# Patient Record
Sex: FEMALE | Race: WHITE | NOT HISPANIC OR LATINO | Employment: FULL TIME | URBAN - METROPOLITAN AREA
[De-identification: names, ages, dates, MRNs, and addresses within clinical notes are randomized per-mention and may not be internally consistent; named-entity substitution may affect disease eponyms.]

---

## 2019-02-06 ENCOUNTER — TELEPHONE (OUTPATIENT)
Dept: SCHEDULING | Facility: IMAGING CENTER | Age: 29
End: 2019-02-06

## 2019-03-01 ENCOUNTER — OFFICE VISIT (OUTPATIENT)
Dept: MEDICAL GROUP | Facility: PHYSICIAN GROUP | Age: 29
End: 2019-03-01
Payer: COMMERCIAL

## 2019-03-01 VITALS
HEART RATE: 100 BPM | DIASTOLIC BLOOD PRESSURE: 58 MMHG | BODY MASS INDEX: 19.62 KG/M2 | HEIGHT: 67 IN | TEMPERATURE: 99.1 F | OXYGEN SATURATION: 96 % | WEIGHT: 125 LBS | SYSTOLIC BLOOD PRESSURE: 110 MMHG

## 2019-03-01 DIAGNOSIS — M62.838 MUSCLE SPASM: ICD-10-CM

## 2019-03-01 DIAGNOSIS — F41.1 GENERALIZED ANXIETY DISORDER: ICD-10-CM

## 2019-03-01 DIAGNOSIS — Z13.228 ENCOUNTER FOR SCREENING FOR METABOLIC DISORDER: ICD-10-CM

## 2019-03-01 DIAGNOSIS — F33.1 MODERATE EPISODE OF RECURRENT MAJOR DEPRESSIVE DISORDER (HCC): ICD-10-CM

## 2019-03-01 DIAGNOSIS — Z11.3 ENCOUNTER FOR SCREENING EXAMINATION FOR SEXUALLY TRANSMITTED DISEASE: ICD-10-CM

## 2019-03-01 PROCEDURE — 99204 OFFICE O/P NEW MOD 45 MIN: CPT | Performed by: INTERNAL MEDICINE

## 2019-03-01 RX ORDER — PROPRANOLOL HYDROCHLORIDE 20 MG/1
20 TABLET ORAL 3 TIMES DAILY
Qty: 90 TAB | Refills: 11 | Status: SHIPPED | OUTPATIENT
Start: 2019-03-01 | End: 2019-08-07 | Stop reason: SDUPTHER

## 2019-03-01 RX ORDER — BUPROPION HYDROCHLORIDE 150 MG/1
150 TABLET ORAL EVERY MORNING
COMMUNITY
End: 2019-03-01

## 2019-03-01 RX ORDER — PROPRANOLOL HYDROCHLORIDE 10 MG/1
10 TABLET ORAL 3 TIMES DAILY
COMMUNITY
End: 2019-03-01

## 2019-03-01 RX ORDER — CYCLOBENZAPRINE HCL 10 MG
10 TABLET ORAL 3 TIMES DAILY PRN
Qty: 30 TAB | Refills: 1 | Status: SHIPPED | OUTPATIENT
Start: 2019-03-01 | End: 2020-08-06 | Stop reason: SDUPTHER

## 2019-03-01 RX ORDER — BUPROPION HYDROCHLORIDE 300 MG/1
300 TABLET ORAL EVERY MORNING
Qty: 90 TAB | Refills: 1 | Status: SHIPPED | OUTPATIENT
Start: 2019-03-01 | End: 2019-03-20

## 2019-03-01 ASSESSMENT — PATIENT HEALTH QUESTIONNAIRE - PHQ9
SUM OF ALL RESPONSES TO PHQ QUESTIONS 1-9: 19
5. POOR APPETITE OR OVEREATING: 2 - MORE THAN HALF THE DAYS
CLINICAL INTERPRETATION OF PHQ2 SCORE: 4

## 2019-03-01 NOTE — ASSESSMENT & PLAN NOTE
This is a chronic health problem that is uncontrolled with current medications and lifestyle measures.  Patient is currently using bupropion 150 mg daily, PHQ 9 score in the office today was 19.Requesting for a psychiatry referral. Last seen 08/2018 in [place].

## 2019-03-01 NOTE — ASSESSMENT & PLAN NOTE
This is a new problem which started 1 month back . Denies any trauma except the regular physical stress at work.

## 2019-03-01 NOTE — ASSESSMENT & PLAN NOTE
This is a chronic health problem that is uncontrolled with current medications and lifestyle measures.  Currently on propranolol 10 mg 3 times daily.

## 2019-03-01 NOTE — PROGRESS NOTES
CC: To establish care with new PCP, depression.    HISTORY OF THE PRESENT ILLNESS: Patient is a 28 y.o. female. This pleasant patient is here today to discuss her medical conditions as mentioned in HPI below.  Patient has recently relocated from [place], has been following a psychiatrist there.  She also has ADHD as per the patient and I do not see any medications at this time.  Requesting for the controlled substances to initiate on her which I have deferred it to the psychiatric referral for their decision.    Health Maintenance: Due for HPV, tetanus and flu vaccines which patient thinks she received it in the previous place of this, will get the records.  Due for Pap smear which is okay to get in the office in her next visit.      Moderate episode of recurrent major depressive disorder (HCC)  This is a chronic health problem that is uncontrolled with current medications and lifestyle measures.  Patient is currently using bupropion 150 mg daily, PHQ 9 score in the office today was 19.Requesting for a psychiatry referral. Last seen 08/2018 in [place].    Generalized anxiety disorder  This is a chronic health problem that is uncontrolled with current medications and lifestyle measures.  Currently on propranolol 10 mg 3 times daily.    Muscle spasm  This is a new problem which started 1 month back . Denies any trauma except the regular physical stress at work.    PHQ score 19, BMI within normal limits, no tobacco, no fall injuries    Allergies: Patient has no known allergies.    Current Outpatient Prescriptions Ordered in [facility]   Medication Sig Dispense Refill   • propranolol (INDERAL) 20 MG Tab Take 1 Tab by mouth 3 times a day. 90 Tab 11   • buPROPion (WELLBUTRIN XL) 300 MG XL tablet Take 1 Tab by mouth every morning. 90 Tab 1   • cyclobenzaprine (FLEXERIL) 10 MG Tab Take 1 Tab by mouth 3 times a day as needed. 30 Tab 1     No current Epic-ordered facility-administered medications on file.        No past medical history  on file.    Past Surgical History:   Procedure Laterality Date   • TONSILLECTOMY         Social History   Substance Use Topics   • Smoking status: Current Every Day Smoker     Years: 12.00     Types: Cigarettes     Start date: 2006   • Smokeless tobacco: Never Used      Comment: 3-4 cigarettes / day   • Alcohol use 6.0 oz/week     10 Cans of beer per week      Comment: weekends        Social History     Social History Narrative   • No narrative on file       Family History   Problem Relation Age of Onset   • Cancer Mother         Ovarian cancer 40's.   • No Known Problems Father    • No Known Problems Sister    • No Known Problems Brother    • No Known Problems Maternal Grandmother    • No Known Problems Maternal Grandfather    • No Known Problems Paternal Grandmother    • No Known Problems Paternal Grandfather        ROS:     - Constitutional: Negative for fever, chills, unexpected weight change, and fatigue/generalized weakness.     - HEENT: Negative for headaches, vision changes, hearing changes, ear pain, ear discharge, rhinorrhea, sinus congestion, sore throat, and neck pain.      - Respiratory: Negative for cough, sputum production, chest congestion, dyspnea, wheezing, and crackles.      - Cardiovascular: Negative for chest pain, palpitations, orthopnea, and bilateral lower extremity edema.     - Gastrointestinal: Negative for heartburn, nausea, vomiting, abdominal pain, hematochezia, melena, diarrhea, constipation, and greasy/foul-smelling stools.     - Genitourinary: Negative for dysuria, polyuria, hematuria, pyuria, urinary urgency, and urinary incontinence.     - Musculoskeletal: Negative for myalgias, back pain, and joint pain.     - Skin: Negative for rash, itching, cyanotic skin color change.     - Neurological: Negative for dizziness, tingling, tremors, focal sensory deficit, focal weakness and headaches.     - Endo/Heme/Allergies: Does not bruise/bleed easily.     - Psychiatric/Behavioral: PHQ 9 is 19  "Negative for suicidal/homicidal ideation and memory loss.          Exam: Blood pressure 110/58, pulse 100, temperature 37.3 °C (99.1 °F), temperature source Temporal, height 1.702 m (5' 7\"), weight 56.7 kg (125 lb), SpO2 96 %. Body mass index is 19.58 kg/m².    General: Normal appearing. No distress.  HEENT: Normocephalic. Eyes conjunctiva clear lids without ptosis, pupils equal and reactive to light accommodation, ears normal shape and contour, canals are clear bilaterally, tympanic membranes are benign, nasal mucosa benign, oropharynx is without erythema, edema or exudates.   Neck: Supple without JVD or bruit. Thyroid is not enlarged.  Positive for right upper border of trapezius tense and tender.  Pulmonary: Clear to ausculation.  Normal effort. No rales, ronchi, or wheezing.  Cardiovascular: Regular rate and rhythm without murmur. Carotid and radial pulses are intact and equal bilaterally.  Abdomen: Soft, nontender, nondistended. Normal bowel sounds. Liver and spleen are not palpable  Neurologic: Grossly nonfocal  Lymph: No cervical, supraclavicular or axillary lymph nodes are palpable  Skin: Warm and dry.  No obvious lesions.  Musculoskeletal: Normal gait. No extremity cyanosis, clubbing, or edema.  Psych: PHQ 9 is 19 Normal mood and affect. Alert and oriented x3. Judgment and insight is normal.    Please note that this dictation was created using voice recognition software. I have made every reasonable attempt to correct obvious errors, but I expect that there are errors of grammar and possibly content that I did not discover before finalizing the note.      Assessment/Plan  1. Moderate episode of recurrent major depressive disorder (HCC)  Uncontrolled with PHQ 9 is 19.  I increased the dose of her Wellbutrin to 300 mg daily, will give referral to psychiatry and also check a thyroid function test and treat if abnormal.  - Patient has been identified as being depressed and appropriate orders and counseling have " been given  - REFERRAL TO PSYCHIATRY  - TSH WITH REFLEX TO FT4; Future  - buPROPion (WELLBUTRIN XL) 300 MG XL tablet; Take 1 Tab by mouth every morning.  Dispense: 90 Tab; Refill: 1    2. Generalized anxiety disorder  Uncontrolled, her heart rate at 100s.  Have increased the dose of propranolol 20 mg.  - propranolol (INDERAL) 20 MG Tab; Take 1 Tab by mouth 3 times a day.  Dispense: 90 Tab; Refill: 11    3. Encounter for screening examination for sexually transmitted disease  Patient requesting for STI screening, denies any exposure.  - HIV AG/AB COMBO ASSAY SCREENING; Future  - Chlamydia/GC PCR Urine Or Swab; Future  - RPR (SYPHILIS); Future  - HEPATITIS PANEL ACUTE(4 COMPONENTS); Future    4. Muscle spasm  Uncontrolled, will start on muscle relaxer.  - cyclobenzaprine (FLEXERIL) 10 MG Tab; Take 1 Tab by mouth 3 times a day as needed.  Dispense: 30 Tab; Refill: 1    5. Encounter for screening for metabolic disorder  Never had baseline lab work will do at this time.  - Comp Metabolic Panel; Future  - CBC WITH DIFFERENTIAL; Future

## 2019-03-09 ENCOUNTER — HOSPITAL ENCOUNTER (OUTPATIENT)
Dept: LAB | Facility: MEDICAL CENTER | Age: 29
End: 2019-03-09
Attending: INTERNAL MEDICINE
Payer: COMMERCIAL

## 2019-03-09 DIAGNOSIS — Z11.3 ENCOUNTER FOR SCREENING EXAMINATION FOR SEXUALLY TRANSMITTED DISEASE: ICD-10-CM

## 2019-03-09 DIAGNOSIS — F33.1 MODERATE EPISODE OF RECURRENT MAJOR DEPRESSIVE DISORDER (HCC): ICD-10-CM

## 2019-03-09 DIAGNOSIS — Z13.228 ENCOUNTER FOR SCREENING FOR METABOLIC DISORDER: ICD-10-CM

## 2019-03-09 LAB
ALBUMIN SERPL BCP-MCNC: 4.7 G/DL (ref 3.2–4.9)
ALBUMIN/GLOB SERPL: 1.7 G/DL
ALP SERPL-CCNC: 38 U/L (ref 30–99)
ALT SERPL-CCNC: 11 U/L (ref 2–50)
ANION GAP SERPL CALC-SCNC: 11 MMOL/L (ref 0–11.9)
AST SERPL-CCNC: 12 U/L (ref 12–45)
BASOPHILS # BLD AUTO: 0.6 % (ref 0–1.8)
BASOPHILS # BLD: 0.05 K/UL (ref 0–0.12)
BILIRUB SERPL-MCNC: 0.4 MG/DL (ref 0.1–1.5)
BUN SERPL-MCNC: 9 MG/DL (ref 8–22)
CALCIUM SERPL-MCNC: 9.5 MG/DL (ref 8.5–10.5)
CHLORIDE SERPL-SCNC: 107 MMOL/L (ref 96–112)
CO2 SERPL-SCNC: 22 MMOL/L (ref 20–33)
CREAT SERPL-MCNC: 0.68 MG/DL (ref 0.5–1.4)
EOSINOPHIL # BLD AUTO: 0.12 K/UL (ref 0–0.51)
EOSINOPHIL NFR BLD: 1.3 % (ref 0–6.9)
ERYTHROCYTE [DISTWIDTH] IN BLOOD BY AUTOMATED COUNT: 43.4 FL (ref 35.9–50)
FASTING STATUS PATIENT QL REPORTED: NORMAL
GLOBULIN SER CALC-MCNC: 2.8 G/DL (ref 1.9–3.5)
GLUCOSE SERPL-MCNC: 75 MG/DL (ref 65–99)
HAV IGM SERPL QL IA: NEGATIVE
HBV CORE IGM SER QL: NEGATIVE
HBV SURFACE AG SER QL: NEGATIVE
HCT VFR BLD AUTO: 47.7 % (ref 37–47)
HCV AB SER QL: NEGATIVE
HGB BLD-MCNC: 16.4 G/DL (ref 12–16)
HIV 1+2 AB+HIV1 P24 AG SERPL QL IA: NON REACTIVE
IMM GRANULOCYTES # BLD AUTO: 0.05 K/UL (ref 0–0.11)
IMM GRANULOCYTES NFR BLD AUTO: 0.6 % (ref 0–0.9)
LYMPHOCYTES # BLD AUTO: 2.09 K/UL (ref 1–4.8)
LYMPHOCYTES NFR BLD: 23.5 % (ref 22–41)
MCH RBC QN AUTO: 32.5 PG (ref 27–33)
MCHC RBC AUTO-ENTMCNC: 34.4 G/DL (ref 33.6–35)
MCV RBC AUTO: 94.5 FL (ref 81.4–97.8)
MONOCYTES # BLD AUTO: 0.59 K/UL (ref 0–0.85)
MONOCYTES NFR BLD AUTO: 6.6 % (ref 0–13.4)
NEUTROPHILS # BLD AUTO: 5.99 K/UL (ref 2–7.15)
NEUTROPHILS NFR BLD: 67.4 % (ref 44–72)
NRBC # BLD AUTO: 0 K/UL
NRBC BLD-RTO: 0 /100 WBC
PLATELET # BLD AUTO: 282 K/UL (ref 164–446)
PMV BLD AUTO: 10.9 FL (ref 9–12.9)
POTASSIUM SERPL-SCNC: 4.1 MMOL/L (ref 3.6–5.5)
PROT SERPL-MCNC: 7.5 G/DL (ref 6–8.2)
RBC # BLD AUTO: 5.05 M/UL (ref 4.2–5.4)
SODIUM SERPL-SCNC: 140 MMOL/L (ref 135–145)
TREPONEMA PALLIDUM IGG+IGM AB [PRESENCE] IN SERUM OR PLASMA BY IMMUNOASSAY: NON REACTIVE
TSH SERPL DL<=0.005 MIU/L-ACNC: 0.88 UIU/ML (ref 0.38–5.33)
WBC # BLD AUTO: 8.9 K/UL (ref 4.8–10.8)

## 2019-03-09 PROCEDURE — 87491 CHLMYD TRACH DNA AMP PROBE: CPT

## 2019-03-09 PROCEDURE — 80053 COMPREHEN METABOLIC PANEL: CPT

## 2019-03-09 PROCEDURE — 85025 COMPLETE CBC W/AUTO DIFF WBC: CPT

## 2019-03-09 PROCEDURE — 84443 ASSAY THYROID STIM HORMONE: CPT

## 2019-03-09 PROCEDURE — 36415 COLL VENOUS BLD VENIPUNCTURE: CPT

## 2019-03-09 PROCEDURE — 86780 TREPONEMA PALLIDUM: CPT

## 2019-03-09 PROCEDURE — 80074 ACUTE HEPATITIS PANEL: CPT

## 2019-03-09 PROCEDURE — 87389 HIV-1 AG W/HIV-1&-2 AB AG IA: CPT

## 2019-03-09 PROCEDURE — 87591 N.GONORRHOEAE DNA AMP PROB: CPT

## 2019-03-10 LAB
C TRACH DNA SPEC QL NAA+PROBE: NEGATIVE
N GONORRHOEA DNA SPEC QL NAA+PROBE: NEGATIVE
SPECIMEN SOURCE: NORMAL

## 2019-03-20 ENCOUNTER — HOSPITAL ENCOUNTER (OUTPATIENT)
Facility: MEDICAL CENTER | Age: 29
End: 2019-03-20
Attending: INTERNAL MEDICINE
Payer: COMMERCIAL

## 2019-03-20 ENCOUNTER — OFFICE VISIT (OUTPATIENT)
Dept: MEDICAL GROUP | Facility: PHYSICIAN GROUP | Age: 29
End: 2019-03-20
Payer: COMMERCIAL

## 2019-03-20 VITALS
HEART RATE: 81 BPM | DIASTOLIC BLOOD PRESSURE: 62 MMHG | BODY MASS INDEX: 19.58 KG/M2 | SYSTOLIC BLOOD PRESSURE: 116 MMHG | OXYGEN SATURATION: 96 % | HEIGHT: 67 IN | TEMPERATURE: 98.9 F

## 2019-03-20 DIAGNOSIS — Z12.4 PAP SMEAR FOR CERVICAL CANCER SCREENING: ICD-10-CM

## 2019-03-20 DIAGNOSIS — Z72.0 TOBACCO USE: ICD-10-CM

## 2019-03-20 DIAGNOSIS — R92.30 DENSE BREAST TISSUE: ICD-10-CM

## 2019-03-20 DIAGNOSIS — Z71.6 ENCOUNTER FOR SMOKING CESSATION COUNSELING: ICD-10-CM

## 2019-03-20 PROCEDURE — 88175 CYTOPATH C/V AUTO FLUID REDO: CPT

## 2019-03-20 PROCEDURE — 87624 HPV HI-RISK TYP POOLED RSLT: CPT

## 2019-03-20 PROCEDURE — 99000 SPECIMEN HANDLING OFFICE-LAB: CPT | Performed by: INTERNAL MEDICINE

## 2019-03-20 PROCEDURE — 99395 PREV VISIT EST AGE 18-39: CPT | Performed by: INTERNAL MEDICINE

## 2019-03-20 RX ORDER — BUPROPION HYDROCHLORIDE 150 MG/1
150 TABLET ORAL EVERY MORNING
COMMUNITY
End: 2019-03-20 | Stop reason: SDUPTHER

## 2019-03-20 RX ORDER — BUPROPION HYDROCHLORIDE 150 MG/1
150 TABLET ORAL EVERY MORNING
Qty: 90 TAB | Refills: 1 | Status: SHIPPED | OUTPATIENT
Start: 2019-03-20 | End: 2019-08-07 | Stop reason: SDUPTHER

## 2019-03-21 DIAGNOSIS — Z12.4 PAP SMEAR FOR CERVICAL CANCER SCREENING: ICD-10-CM

## 2019-03-21 NOTE — ASSESSMENT & PLAN NOTE
This is a chronic health problem that is uncontrolled with current medications and lifestyle measures.  Takes up to 4 cigarettes every day.

## 2019-03-21 NOTE — PROGRESS NOTES
SUBJECTIVE: 28 y.o. female for annual routine gynecologic exam  Chief Complaint   Patient presents with   • Gynecologic Exam   • Other     lowered dose of wellbutrin       Obstetric History       T0      L0     SAB0   TAB0   Ectopic0   Molar0   Multiple0   Live Births0       Last Pap:  , normal.  History   Sexual Activity   • Sexual activity: Yes   • Partners: Male   • Birth control/ protection: IUD     Comment: Single, Work as Customer service      Sexual history: currently sexually active   H/O Abnormal Pap no  She  reports that she has been smoking Cigarettes.  She started smoking about 13 years ago. She has smoked for the past 12.00 years. She has never used smokeless tobacco.        Allergies: Patient has no known allergies.     ROS:    Menses every month with 28 days moderate bleeding   Cramping is mild, moderate.   She does take OTC analgesics for cramping  No significant bloating/fluid retention, pelvic pain, or dyspareunia. No vaginal discharge   No breast tenderness, mass, nipple discharge, changes in size or contour, or abnormal cyclic discomfort.  No urinary tract symptoms, no incontinence, no polydipsia, polyuria,  No abdominal pain, change in bowel habits, black or bloody stools.    No unusual headaches, no visual changes, menstrual migraines   No prolonged cough. No dyspnea or chest pain on exertion.  No depression, labile mood, anxiety, libido changes, insomnia.  No temperature intolerance.  No new/concerning skin lesions, concerns.     Exercise: minimal exercise  Preventive Care:Uptodate    Current medicines (including changes today)  Current Outpatient Prescriptions   Medication Sig Dispense Refill   • buPROPion (WELLBUTRIN XL) 150 MG XL tablet Take 150 mg by mouth every morning.     • propranolol (INDERAL) 20 MG Tab Take 1 Tab by mouth 3 times a day. 90 Tab 11   • cyclobenzaprine (FLEXERIL) 10 MG Tab Take 1 Tab by mouth 3 times a day as needed. 30 Tab 1   • buPROPion (WELLBUTRIN  "XL) 300 MG XL tablet Take 1 Tab by mouth every morning. (Patient not taking: Reported on 3/20/2019) 90 Tab 1     No current facility-administered medications for this visit.      She  has no past medical history on file.  She  has a past surgical history that includes tonsillectomy.     Family History:   Family History   Problem Relation Age of Onset   • Cancer Mother         Ovarian cancer 40's.   • No Known Problems Father    • No Known Problems Sister    • No Known Problems Brother    • No Known Problems Maternal Grandmother    • No Known Problems Maternal Grandfather    • No Known Problems Paternal Grandmother    • No Known Problems Paternal Grandfather        OBJECTIVE:   /62 (BP Location: Left arm, Patient Position: Sitting, BP Cuff Size: Adult)   Pulse 81   Temp 37.2 °C (98.9 °F) (Temporal)   Ht 1.702 m (5' 7\")   SpO2 96%   BMI 19.58 kg/m²   Body mass index is 19.58 kg/m².    HEAD AND NECK:  Ears normal.  Throat, oral cavity and tongue normal.  Neck supple. No adenopathy or masses in the neck or supraclavicular regions.  No carotid bruits. No thyromegaly. NEURO: Cranial nerves are normal. DTR's normal and symmetric.  CHEST:  Clear, good air entry, no wheezes or rales. HEART:  Regular rate and rhythm.  S1 and S2 normal.  No edema or JVD. ABDOMEN:  Soft without tenderness, guarding, mass or organomegaly.  No CVA tenderness or inguinal adenopathy. EXTREMITIES:  Extremities, reflexes and peripheral pulses are normal. SKIN: color normal, vascularity normal, no edema, temperature normal   No rashes or suspicious skin lesions noted.     Breast Exam: Performed with instruction during examination. No axillary lymphadenopathy, no skin changes, no dominant masses. No nipple retraction .  Positive for left breast mildly dense tissue in 3 o'clock position, no tenderness or erythema.  Pelvic Exam -  Normal external genitalia with no lesions. Normal vaginal mucosa with normal rugation and no discharge. Cervix " with no visible lesions. No cervical motion tenderness. Uterus is normal sized with no masses. No adnexal tenderness or enlargement appreciated. Thin Prep Pap is obtained, vaginal swab is not obtained and specimen(s) sent to lab  Rectal: deferred    <ASSESSMENT and PLAN>  1. Pap smear for cervical cancer screening  - THINPREP PAP WITH HPV; Future    2. Tobacco use  3. Encounter for smoking cessation counseling  Patient was counseled on smoking cessation, we discussed the benefits of quitting including decrease risk of MI by 50% after one year of cessation, decreased risk of lung cancer after 12 years, one cigarette is enough to paralyze cilia for 24 hours leading to increase risk of pulmonary infection, etc.... .Also encouraged to set a stop date.  This took up to the duration of 3 minutes.    4. Dense breast tissue  As mentioned in physical exam findings, this looks like normal dense breast tissue of a young female.  But given instructions to check once a week and let me know if any changes for possible need of ultrasound at that time.  Patient understood and agreed with the plan.    Discussed  breast self exam, STD prevention   Follow-up in 1 years for next Gyn exam and 3 years for next Pap.   Next office visit for recheck of chronic medical conditions is due in 4 months

## 2019-03-22 LAB
CYTOLOGY REG CYTOL: NORMAL
HPV HR 12 DNA CVX QL NAA+PROBE: NEGATIVE
HPV16 DNA SPEC QL NAA+PROBE: NEGATIVE
HPV18 DNA SPEC QL NAA+PROBE: NEGATIVE
SPECIMEN SOURCE: NORMAL

## 2019-03-25 RX ORDER — FLUCONAZOLE 150 MG/1
150 TABLET ORAL DAILY
Qty: 2 TAB | Refills: 0 | Status: SHIPPED | OUTPATIENT
Start: 2019-03-25 | End: 2019-03-27

## 2019-03-26 NOTE — PROGRESS NOTES
You do have some Fungal Candida seen in the smear. I have sent Diflucan tablet to your pharmacy.  Your Pap smear is normal, recommend repeat after 3 years.  Alisa Guaman M.D.

## 2019-06-05 ENCOUNTER — OFFICE VISIT (OUTPATIENT)
Dept: MEDICAL GROUP | Facility: PHYSICIAN GROUP | Age: 29
End: 2019-06-05
Payer: COMMERCIAL

## 2019-06-05 VITALS
OXYGEN SATURATION: 99 % | DIASTOLIC BLOOD PRESSURE: 74 MMHG | WEIGHT: 122 LBS | HEIGHT: 67 IN | HEART RATE: 95 BPM | TEMPERATURE: 98.7 F | BODY MASS INDEX: 19.15 KG/M2 | SYSTOLIC BLOOD PRESSURE: 122 MMHG

## 2019-06-05 DIAGNOSIS — Z72.0 TOBACCO USE: ICD-10-CM

## 2019-06-05 DIAGNOSIS — Z23 NEED FOR VACCINATION: ICD-10-CM

## 2019-06-05 DIAGNOSIS — N63.20 LEFT BREAST LUMP: ICD-10-CM

## 2019-06-05 DIAGNOSIS — Z71.6 ENCOUNTER FOR SMOKING CESSATION COUNSELING: ICD-10-CM

## 2019-06-05 PROCEDURE — 99406 BEHAV CHNG SMOKING 3-10 MIN: CPT | Performed by: INTERNAL MEDICINE

## 2019-06-05 PROCEDURE — 90472 IMMUNIZATION ADMIN EACH ADD: CPT | Performed by: INTERNAL MEDICINE

## 2019-06-05 PROCEDURE — 99214 OFFICE O/P EST MOD 30 MIN: CPT | Mod: 25 | Performed by: INTERNAL MEDICINE

## 2019-06-05 PROCEDURE — 90715 TDAP VACCINE 7 YRS/> IM: CPT | Performed by: INTERNAL MEDICINE

## 2019-06-05 PROCEDURE — 90732 PPSV23 VACC 2 YRS+ SUBQ/IM: CPT | Performed by: INTERNAL MEDICINE

## 2019-06-05 PROCEDURE — 90471 IMMUNIZATION ADMIN: CPT | Performed by: INTERNAL MEDICINE

## 2019-06-06 NOTE — PROGRESS NOTES
CC: Short visit, left breast lump.    HISTORY OF PRESENT ILLNESS: Patient is a 29 y.o. female established patient who presents today to discuss on medical conditions as mentioned in HPI below.    Health Maintenance: Patient is due for pneumonia vaccine and tetanus vaccine which is okay to get in the office today.    Tobacco use  This is a chronic health problem that is uncontrolled with current medications and lifestyle measures. Currently on 4 cig / day. Tried the patch before but ended up with smoking on the patch. Has been smoking for 12 years.    Left breast lump  This is a new problem which she saw few months back and she did have dense breasts in her Pap visit with me. Denies any discharge from nipple.  Does have occasional tenderness when patient lies to one side.      PHQ score 0, BMI within normal limits, chronic active tobacco, no fall injuries.    Patient Active Problem List    Diagnosis Date Noted   • Left breast lump 06/05/2019   • Tobacco use 03/20/2019   • Moderate episode of recurrent major depressive disorder (HCC) 03/01/2019   • Generalized anxiety disorder 03/01/2019   • Encounter for screening examination for sexually transmitted disease 03/01/2019   • Muscle spasm 03/01/2019      Allergies:Patient has no known allergies.    Current Outpatient Prescriptions   Medication Sig Dispense Refill   • buPROPion (WELLBUTRIN XL) 150 MG XL tablet Take 1 Tab by mouth every morning. 90 Tab 1   • propranolol (INDERAL) 20 MG Tab Take 1 Tab by mouth 3 times a day. 90 Tab 11   • cyclobenzaprine (FLEXERIL) 10 MG Tab Take 1 Tab by mouth 3 times a day as needed. 30 Tab 1     No current facility-administered medications for this visit.        Social History   Substance Use Topics   • Smoking status: Current Every Day Smoker     Years: 12.00     Types: Cigarettes     Start date: 2006   • Smokeless tobacco: Never Used      Comment: 3-4 cigarettes / day   • Alcohol use 6.0 oz/week     10 Cans of beer per week       "Comment: weekends      Social History     Social History Narrative   • No narrative on file       Family History   Problem Relation Age of Onset   • Cancer Mother         Ovarian cancer 40's.   • No Known Problems Father    • No Known Problems Sister    • No Known Problems Brother    • No Known Problems Maternal Grandmother    • No Known Problems Maternal Grandfather    • No Known Problems Paternal Grandmother    • No Known Problems Paternal Grandfather         ROS:     - Constitutional:  Negative for fever, chills, unexpected weight change, and fatigue/generalized weakness.    - HEENT:  Negative for headaches, vision changes, hearing changes, ear pain, ear discharge, rhinorrhea, sinus congestion, sore throat, and neck pain.      - Respiratory: Negative for cough, sputum production, chest congestion, dyspnea, wheezing, and crackles.      - Cardiovascular: Negative for chest pain, palpitations, orthopnea, and bilateral lower extremity edema.     - Gastrointestinal: Negative for heartburn, nausea, vomiting, abdominal pain, hematochezia, melena, diarrhea, constipation, and greasy/foul-smelling stools.     - Genitourinary: Negative for dysuria, polyuria, hematuria, pyuria, urinary urgency, and urinary incontinence.     - Musculoskeletal: Negative for myalgias, back pain, and joint pain.     - Skin: As mentioned in HPI above negative for rash, itching, cyanotic skin color change.     - Neurological: Negative for dizziness, tingling, tremors, focal sensory deficit, focal weakness and headaches.     - Endo/Heme/Allergies: Does not bruise/bleed easily.     - Psychiatric/Behavioral: Negative for depression, suicidal/homicidal ideation and memory loss.      Last lab work in April 2019 reviewed.      Exam:    /74 (BP Location: Right arm, Patient Position: Sitting, BP Cuff Size: Adult)   Pulse 95   Temp 37.1 °C (98.7 °F) (Temporal)   Ht 1.702 m (5' 7\")   Wt 55.3 kg (122 lb)   SpO2 99%  Body mass index is 19.11 " kg/m².    General:  Well nourished, well developed female in NAD  Head is grossly normal.  Neck: Supple without JVD or bruit. Thyroid is not enlarged.  Pulmonary: Clear to ausculation and percussion.  Normal effort. No rales, ronchi, or wheezing.  Cardiovascular: Regular rate and rhythm without murmur. Carotid and radial pulses are intact and equal bilaterally.  Extremities: no clubbing, cyanosis, or edema.  Breast exam : Right breast within normal limits, left breast positive for small density palpable in the upper outer quadrant measuring around 1 cm in diameter, tenderness while palpating.  No axillary lymphadenopathy.    Please note that this dictation was created using voice recognition software. I have made every reasonable attempt to correct obvious errors, but I expect that there are errors of grammar and possibly content that I did not discover before finalizing the note.    Assessment/Plan:  1. Left breast lump  New problem, per my HPI above and physical exam findings will place an ultrasound of the breast at this time, will await for the report for further recommendations.  Plan extend to the patient which she understood and agreed.  - US-LOCALIZATION BREAST SINGLE LEFT; Future    2. Tobacco use  3. Encounter for smoking cessation counseling  Patient was counseled on smoking cessation, we discussed the benefits of quitting including decrease risk of MI by 50% after one year of cessation, decreased risk of lung cancer after 12 years, one cigarette is enough to paralyze cilia for 24 hours leading to increase risk of pulmonary infection, etc.... .Also encouraged to set a stop date.  This took more than 3 minutes duration.  - To continue current Wellbutrin 150 mg daily.  - REFERRAL TO TOBACCO CESSATION PROGRAM    4. Need for vaccination  - PneumoVax PPV23 =>1yo  - Tdap =>8yo IM

## 2019-06-06 NOTE — ASSESSMENT & PLAN NOTE
This is a chronic health problem that is uncontrolled with current medications and lifestyle measures. Currently on 4 cig / day. Tried the patch before but ended up with smoking on the patch. Has been smoking for 12 years.

## 2019-06-06 NOTE — ASSESSMENT & PLAN NOTE
This is a new problem which she saw few months back and she did have dense breasts in her Pap visit with me. Denies any discharge from nipple.  Does have occasional tenderness when patient lies to one side.

## 2019-07-01 ENCOUNTER — HOSPITAL ENCOUNTER (OUTPATIENT)
Dept: RADIOLOGY | Facility: MEDICAL CENTER | Age: 29
End: 2019-07-01
Attending: INTERNAL MEDICINE
Payer: COMMERCIAL

## 2019-07-01 DIAGNOSIS — N63.20 LEFT BREAST LUMP: ICD-10-CM

## 2019-07-01 PROCEDURE — 76642 ULTRASOUND BREAST LIMITED: CPT | Mod: LT

## 2019-08-06 DIAGNOSIS — F41.1 GENERALIZED ANXIETY DISORDER: ICD-10-CM

## 2019-08-06 NOTE — TELEPHONE ENCOUNTER
Was the patient seen in the last year in this department? Yes    Does patient have an active prescription for medications requested? Yes    Received Request Via: Patient     Pt prefers a 90 day supply.

## 2019-08-07 RX ORDER — PROPRANOLOL HYDROCHLORIDE 20 MG/1
20 TABLET ORAL 3 TIMES DAILY
Qty: 270 TAB | Refills: 1 | Status: SHIPPED | OUTPATIENT
Start: 2019-08-07 | End: 2020-08-11

## 2019-08-07 RX ORDER — BUPROPION HYDROCHLORIDE 150 MG/1
150 TABLET ORAL EVERY MORNING
Qty: 90 TAB | Refills: 1 | Status: SHIPPED | OUTPATIENT
Start: 2019-08-07 | End: 2020-08-11 | Stop reason: SDUPTHER

## 2019-08-07 NOTE — TELEPHONE ENCOUNTER
Refill X 6 months, sent to pharmacy.Pt. Seen in the last 6 months per protocol.   Lab Results   Component Value Date/Time    SODIUM 140 03/09/2019 10:16 AM    POTASSIUM 4.1 03/09/2019 10:16 AM    CHLORIDE 107 03/09/2019 10:16 AM    CO2 22 03/09/2019 10:16 AM    GLUCOSE 75 03/09/2019 10:16 AM    BUN 9 03/09/2019 10:16 AM    CREATININE 0.68 03/09/2019 10:16 AM

## 2020-07-06 ENCOUNTER — OFFICE VISIT (OUTPATIENT)
Dept: MEDICAL GROUP | Facility: MEDICAL CENTER | Age: 30
End: 2020-07-06
Payer: COMMERCIAL

## 2020-07-06 ENCOUNTER — HOSPITAL ENCOUNTER (OUTPATIENT)
Facility: MEDICAL CENTER | Age: 30
End: 2020-07-06
Attending: NURSE PRACTITIONER
Payer: COMMERCIAL

## 2020-07-06 VITALS
OXYGEN SATURATION: 96 % | TEMPERATURE: 99.2 F | SYSTOLIC BLOOD PRESSURE: 132 MMHG | DIASTOLIC BLOOD PRESSURE: 70 MMHG | HEART RATE: 89 BPM | RESPIRATION RATE: 14 BRPM | HEIGHT: 66 IN | BODY MASS INDEX: 19.13 KG/M2 | WEIGHT: 119.05 LBS

## 2020-07-06 DIAGNOSIS — F31.31 BIPOLAR AFFECTIVE DISORDER, CURRENTLY DEPRESSED, MILD (HCC): ICD-10-CM

## 2020-07-06 DIAGNOSIS — G44.86 CERVICOGENIC HEADACHE: ICD-10-CM

## 2020-07-06 DIAGNOSIS — N89.8 VAGINAL IRRITATION: ICD-10-CM

## 2020-07-06 PROCEDURE — 99214 OFFICE O/P EST MOD 30 MIN: CPT | Performed by: NURSE PRACTITIONER

## 2020-07-06 PROCEDURE — 87660 TRICHOMONAS VAGIN DIR PROBE: CPT

## 2020-07-06 PROCEDURE — 87510 GARDNER VAG DNA DIR PROBE: CPT

## 2020-07-06 PROCEDURE — 87480 CANDIDA DNA DIR PROBE: CPT

## 2020-07-06 RX ORDER — NAPROXEN 500 MG/1
500 TABLET ORAL 2 TIMES DAILY WITH MEALS
Qty: 60 TAB | Refills: 2 | Status: SHIPPED | OUTPATIENT
Start: 2020-07-06 | End: 2021-11-08

## 2020-07-06 ASSESSMENT — PATIENT HEALTH QUESTIONNAIRE - PHQ9
SUM OF ALL RESPONSES TO PHQ9 QUESTIONS 1 AND 2: 2
8. MOVING OR SPEAKING SO SLOWLY THAT OTHER PEOPLE COULD HAVE NOTICED. OR THE OPPOSITE, BEING SO FIGETY OR RESTLESS THAT YOU HAVE BEEN MOVING AROUND A LOT MORE THAN USUAL: 0
9. THOUGHTS THAT YOU WOULD BE BETTER OFF DEAD, OR OF HURTING YOURSELF: 0
4. FEELING TIRED OR HAVING LITTLE ENERGY: 3
7. TROUBLE CONCENTRATING ON THINGS, SUCH AS READING THE NEWSPAPER OR WATCHING TELEVISION: 1
1. LITTLE INTEREST OR PLEASURE IN DOING THINGS: 1
5. POOR APPETITE OR OVEREATING: 2
SUM OF ALL RESPONSES TO PHQ QUESTIONS 1-9: 11
3. TROUBLE FALLING OR STAYING ASLEEP OR SLEEPING TOO MUCH: 2
6. FEELING BAD ABOUT YOURSELF - OR THAT YOU ARE A FAILURE OR HAVE LET YOURSELF OR YOUR FAMILY DOWN: 1
2. FEELING DOWN, DEPRESSED, IRRITABLE, OR HOPELESS: 1

## 2020-07-06 ASSESSMENT — FIBROSIS 4 INDEX: FIB4 SCORE: 0.36

## 2020-07-06 ASSESSMENT — ANXIETY QUESTIONNAIRES
2. NOT BEING ABLE TO STOP OR CONTROL WORRYING: NEARLY EVERY DAY
3. WORRYING TOO MUCH ABOUT DIFFERENT THINGS: NEARLY EVERY DAY
6. BECOMING EASILY ANNOYED OR IRRITABLE: SEVERAL DAYS
5. BEING SO RESTLESS THAT IT IS HARD TO SIT STILL: MORE THAN HALF THE DAYS
GAD7 TOTAL SCORE: 15
7. FEELING AFRAID AS IF SOMETHING AWFUL MIGHT HAPPEN: SEVERAL DAYS
1. FEELING NERVOUS, ANXIOUS, OR ON EDGE: NEARLY EVERY DAY
4. TROUBLE RELAXING: MORE THAN HALF THE DAYS

## 2020-07-06 NOTE — ASSESSMENT & PLAN NOTE
Chronic issue currently managed with bupropion 150 mg daily. States medication was initially working

## 2020-07-06 NOTE — ASSESSMENT & PLAN NOTE
Pt states she is having frequent neck pain and muscle tension in the neck causing bilateral occipital headache. 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 Her work station is adjustable but her employer has not raised it. She has tried finding seats of various heights but not really found any set-up that would not require her to be constantly looking downward and straining her neck.   She occasionally takes ibuprofen with slight relief but not full resolution of pain.  No h/o neck injury or head injury, change in ROM, pain radiating into arms. No numbness or tingling, weakness in the arms. No vision changes or nausea with HA  She had been given a muscle relaxer in the past but felt this made her excessively tired, even when taking it only at bedtime- had difficulty waking in the morning

## 2020-07-06 NOTE — ASSESSMENT & PLAN NOTE
States symptoms started after having her IUD removed a month ago. Was treated with a dose of diflucan several weeks ago but never felt symptoms fully resolved. Still having itching, irritation, increase in white discharge. No pelvic pain, spotting, odor

## 2020-07-06 NOTE — LETTER
July 6, 2020        RE: Sheri Carbajal    To Whom It May Concern;    Sheri Carbajal is seen in my office for primary care. She is diagnosed with chronic neck pain and headaches which are likely worsened by the ergonomic set-up of her work station. I recommend her work station be evaluated for correct ergonomics or the height of her work station be raised/ adjusted. If workplace accomodation paperwork is needed please send to the above fax number.    Please contact me with any questions.    Sincerely,          Arminda LAWRENCE

## 2020-07-07 DIAGNOSIS — N89.8 VAGINAL IRRITATION: ICD-10-CM

## 2020-07-07 NOTE — ASSESSMENT & PLAN NOTE
Reports having been diagnosed with bipolar disorder several years ago, was followed by psychiatry when living in [place]. States she had tried multiple mood stablizers and SSRI's including prozac, lexapro, lamictal abilify, carbamazepine, none of which fully controlled symptoms. She was started on wellbutrin and has found this to be the most helpful although symptoms are still not fully controlled. Still feeling somewhat depressed as well as anxious. Requesting referral to establish with local psychiatric provider  No si/hi, no current manic behavior or delusional thinking  Patient Health Questionaire    Little interest or pleasure in doing things?: 1   Feeling down, depressed, or hopeless?: 1  Trouble falling or staying asleep, or sleeping too much? : 2  Feeling tired or having little energy? : 3  Poor appetite or overeating? : 2  Feeling bad about yourself - or that you are a failure or have let yourself or your family down? : 1  Trouble concentrating on things, such as reading the newspaper or watching television? : 1  Moving or speaking so slowly that other people could have noticed.  Or the opposite - being so fidgety or restless that you have been moving around alot more than usual. : 0  Thoughts that you would be better off dead, or of hurting yourself?: 0  Patient Health Questionnaire Score: 11    If depressive symptoms identified deferred to follow up visit unless specifically addressed in assesment and plan.    Interpretation of PHQ-9 Total Score   Score Severity   1-4 No Depression   5-9 Mild Depression   10-14 Moderate Depression   15-19 Moderately Severe Depression   20-27 Severe Depression    SAMSON-7 Questionnaire    Feeling nervous, anxious, or on edge: Nearly every day  Not being able to sop or control worrying: Nearly every day  Worrying too much about different things: Nearly every day  Trouble relaxing: More than half the days  Being so restless that it's hard to sit still: More than half the  days  Becoming easily annoyed or irritable: Several days  Feeling afraid as if something awful might happen: Several days  Total: 15    Interpretation of SAMSON 7 Total Score   Score Severity :  0-4 No Anxiety   5-9 Mild Anxiety  10-14 Moderate Anxiety  15-21 Severe Anxiety

## 2020-07-07 NOTE — PROGRESS NOTES
Subjective:     Chief Complaint   Patient presents with   • Establish Care     new patient, neck/ head pain from work   • Vaginitis     infection     Sheri Carbajal is a 30 y.o. female here to establish care    Vaginal irritation  States symptoms started after having her IUD removed a month ago. Was treated with a dose of diflucan several weeks ago but never felt symptoms fully resolved. Still having itching, irritation, increase in white discharge. No pelvic pain, spotting, odor    Cervicogenic headache  Pt states she is having frequent neck pain and muscle tension in the neck causing bilateral occipital headache. 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 Her work station is adjustable but her employer has not raised it. She has tried finding seats of various heights but not really found any set-up that would not require her to be constantly looking downward and straining her neck.   She occasionally takes ibuprofen with slight relief but not full resolution of pain.  No h/o neck injury or head injury, change in ROM, pain radiating into arms. No numbness or tingling, weakness in the arms. No vision changes or nausea with HA  She had been given a muscle relaxer in the past but felt this made her excessively tired, even when taking it only at bedtime- had difficulty waking in the morning    Bipolar affective disorder, currently depressed, mild (HCC)  Reports having been diagnosed with bipolar disorder several years ago, was followed by psychiatry when living in [place]. States she had tried multiple mood stablizers and SSRI's including prozac, lexapro, lamictal abilify, carbamazepine, none of which fully controlled symptoms. She was started on wellbutrin and has found this to be the most helpful although symptoms are still not fully controlled. Still feeling somewhat depressed as well as anxious. Requesting referral to establish with local psychiatric  provider  No si/hi, no current manic behavior or delusional thinking  Patient Health Questionaire    Little interest or pleasure in doing things?: 1   Feeling down, depressed, or hopeless?: 1  Trouble falling or staying asleep, or sleeping too much? : 2  Feeling tired or having little energy? : 3  Poor appetite or overeating? : 2  Feeling bad about yourself - or that you are a failure or have let yourself or your family down? : 1  Trouble concentrating on things, such as reading the newspaper or watching television? : 1  Moving or speaking so slowly that other people could have noticed.  Or the opposite - being so fidgety or restless that you have been moving around alot more than usual. : 0  Thoughts that you would be better off dead, or of hurting yourself?: 0  Patient Health Questionnaire Score: 11    If depressive symptoms identified deferred to follow up visit unless specifically addressed in assesment and plan.    Interpretation of PHQ-9 Total Score   Score Severity   1-4 No Depression   5-9 Mild Depression   10-14 Moderate Depression   15-19 Moderately Severe Depression   20-27 Severe Depression    SAMSON-7 Questionnaire    Feeling nervous, anxious, or on edge: Nearly every day  Not being able to sop or control worrying: Nearly every day  Worrying too much about different things: Nearly every day  Trouble relaxing: More than half the days  Being so restless that it's hard to sit still: More than half the days  Becoming easily annoyed or irritable: Several days  Feeling afraid as if something awful might happen: Several days  Total: 15    Interpretation of SAMSON 7 Total Score   Score Severity :  0-4 No Anxiety   5-9 Mild Anxiety  10-14 Moderate Anxiety  15-21 Severe Anxiety           Current medicines (including changes today)  Current Outpatient Medications   Medication Sig Dispense Refill   • naproxen (NAPROSYN) 500 MG Tab Take 1 Tab by mouth 2 times a day, with meals. 60 Tab 2   • propranolol (INDERAL) 20 MG Tab  "Take 1 Tab by mouth 3 times a day. 270 Tab 1   • buPROPion (WELLBUTRIN XL) 150 MG XL tablet Take 1 Tab by mouth every morning. 90 Tab 1   • cyclobenzaprine (FLEXERIL) 10 MG Tab Take 1 Tab by mouth 3 times a day as needed. (Patient not taking: Reported on 7/6/2020) 30 Tab 1     No current facility-administered medications for this visit.      She  has no past medical history on file.    ROS included above     Objective:     /70 (BP Location: Left arm, Patient Position: Sitting, BP Cuff Size: Small adult)   Pulse 89   Temp 37.3 °C (99.2 °F) (Temporal)   Resp 14   Ht 1.676 m (5' 6\")   Wt 54 kg (119 lb 0.8 oz)   SpO2 96%  Body mass index is 19.21 kg/m².     Physical Exam:  General: Alert, oriented in no acute distress.  Eye contact is good, speech is normal, affect anxious  Lungs: clear to auscultation bilaterally, normal effort, no wheeze/ rhonchi/ rales.  CV: regular rate and rhythm, S1, S2, no murmur  MS: mild tenderness over multiple cervical spinous process, no obvious deformity. Muscular tenderness in bilateral trapezius without hypertonicity. Strength 5/5 in bilateral UE, neck with normal ROM  Ext: no edema, color normal, vascularity normal, temperature normal    Assessment and Plan:   The following treatment plan was discussed   1. Vaginal irritation  Pt feels she has a yeast infection. Swab sent for confirmation, will treat pending results  VAGINAL PATHOGENS DNA PANEL   2. Cervicogenic headache  Occipital HA and muscular neck pain exacerbated by work place ergonomics. Letter written requesting either adjustable height desk or ergonomic evaluation. I will complete workplace accommodation paperwork if needed.  We will also try physical therapy and naproxen as needed, advised to take medication with food, do not combine with other NSAIDs  REFERRAL TO PHYSICAL THERAPY Reason for Therapy: Eval/Treat/Report    naproxen (NAPROSYN) 500 MG Tab   3. Bipolar affective disorder, currently depressed, mild (HCC)  " Uncontrolled at this time on wellbutrin, still having persistent depression and anxiety. No current manic bx. She has failed multiple medications in the past, will consult psychiatry for med management  REFERRAL TO PSYCHIATRY       Followup: pending tests         Please note that this dictation was created using voice recognition software. I have worked with consultants from the vendor as well as technical experts from [facility] to optimize the interface. I have made every reasonable attempt to correct obvious errors, but I expect that there are errors of grammar and possibly content that I did not discover before finalizing the note.

## 2020-07-08 LAB
CANDIDA DNA VAG QL PROBE+SIG AMP: NEGATIVE
G VAGINALIS DNA VAG QL PROBE+SIG AMP: NEGATIVE
T VAGINALIS DNA VAG QL PROBE+SIG AMP: NEGATIVE

## 2020-07-24 ENCOUNTER — NURSE TRIAGE (OUTPATIENT)
Dept: HEALTH INFORMATION MANAGEMENT | Facility: OTHER | Age: 30
End: 2020-07-24

## 2020-07-24 NOTE — TELEPHONE ENCOUNTER
1. Caller Name: Sheri Carbajal                     Call Back Number: [phone] (home)       [facility] PCP or Specialty Provider: Yes HOWARD Mae        2.  In the last two weeks, has the patient had any new or worsening symptoms (not explained by alternative diagnosis)? No.    3.  Does patient have any comoribidities? None     4.  Has the patient traveled in the last 14 days OR had any known contact with someone who is suspected or confirmed to have COVID-19?  Yes, Coworker possibly has Covid      Note routed to [facility] Provider: FYI only.     Has an appt at urgent care. States employer has a contract with [facility] to do testing. Verified with scheduling that pt may go ahead and go to urgent care. Pt plans to go to [facility].

## 2020-07-30 ENCOUNTER — OFFICE VISIT (OUTPATIENT)
Dept: MEDICAL GROUP | Facility: MEDICAL CENTER | Age: 30
End: 2020-07-30
Payer: COMMERCIAL

## 2020-07-30 VITALS
OXYGEN SATURATION: 99 % | TEMPERATURE: 102.1 F | DIASTOLIC BLOOD PRESSURE: 68 MMHG | RESPIRATION RATE: 20 BRPM | HEART RATE: 103 BPM | SYSTOLIC BLOOD PRESSURE: 102 MMHG | WEIGHT: 119.05 LBS | BODY MASS INDEX: 19.13 KG/M2 | HEIGHT: 66 IN

## 2020-07-30 DIAGNOSIS — M79.10 MYALGIA: ICD-10-CM

## 2020-07-30 DIAGNOSIS — M54.42 ACUTE MIDLINE LOW BACK PAIN WITH LEFT-SIDED SCIATICA: ICD-10-CM

## 2020-07-30 DIAGNOSIS — R50.9 FEVER AND CHILLS: ICD-10-CM

## 2020-07-30 PROCEDURE — 99214 OFFICE O/P EST MOD 30 MIN: CPT | Performed by: NURSE PRACTITIONER

## 2020-07-30 ASSESSMENT — FIBROSIS 4 INDEX: FIB4 SCORE: 0.36

## 2020-07-30 NOTE — PROGRESS NOTES
Subjective:     Chief Complaint   Patient presents with   • Hip Pain     x4days (radiates to lower back)     Sheri Carbajal is a 30 y.o. female established patient here presenting with new concern of left hip and leg pain.  Reports that this started 4 days ago with insidious onset, significantly worse today.  Current pain is 7 out of 7, bilateral lower back with radiation into the left hip and down the left leg.  She has a very slight pins-and-needles sensation in the leg.  Bothering her most with sitting or walking, slight relief with laying down.  She has not tried any medication for this.  She does not recall any particular injury or unusual activity.    On exam she is found to be slightly tachycardic as well as febrile, 102.6 in the office.  She states that a coworker was being tested for COVID but to her knowledge there is not any confirmed exposure.  She is missing work today due to not feeling well.  She denies dysuria, hematuria, history of renal calculi or pyelonephritis.  Further denies sore throat, congestion, headache, otalgia    No problem-specific Assessment & Plan notes found for this encounter.       Current medicines (including changes today)  Current Outpatient Medications   Medication Sig Dispense Refill   • naproxen (NAPROSYN) 500 MG Tab Take 1 Tab by mouth 2 times a day, with meals. 60 Tab 2   • propranolol (INDERAL) 20 MG Tab Take 1 Tab by mouth 3 times a day. 270 Tab 1   • buPROPion (WELLBUTRIN XL) 150 MG XL tablet Take 1 Tab by mouth every morning. 90 Tab 1   • cyclobenzaprine (FLEXERIL) 10 MG Tab Take 1 Tab by mouth 3 times a day as needed. (Patient not taking: Reported on 7/6/2020) 30 Tab 1     No current facility-administered medications for this visit.      She  has no past medical history on file.    ROS included above     Objective:     /68 (BP Location: Right arm, Patient Position: Sitting, BP Cuff Size: Adult)   Pulse (!) 103   Temp (!) 38.9 °C (102.1 °F) (Temporal)   Resp  "20   Ht 1.676 m (5' 6\")   Wt 54 kg (119 lb 0.8 oz)   SpO2 99%  Body mass index is 19.21 kg/m².     Physical Exam:  General: Alert, oriented in no acute distress.  Eye contact is good, speech is normal, affect calm  HEENT: TMs gray with good landmarks bilaterally. No lymphadenopathy.  Lungs: clear to auscultation bilaterally, normal effort, no wheeze/ rhonchi/ rales.  CV: regular rate and rhythm, S1, S2, no murmur  Abdomen: soft, nontender  MS: Muscular tenderness throughout the mid and low back, no point tenderness over spinous process, no obvious deformity.  Tenderness over the left SI joint.  No tenderness over the left trochanteric bursa, hip with full range of motion.  Strength is 5 out of 5 distal and proximal bilateral lower extremities.  Patellar DTR 2+ bilaterally.  Normal gait  Ext: no edema, color normal, vascularity normal, temperature normal    Assessment and Plan:   The following treatment plan was discussed   1. Fever and chills   patient presents today for evaluation of myalgia and back pain and is found to be febrile on exam.  She has potential exposure to COVID-19 through work.  Her ROS is otherwise negative.  She has no hematuria or dysuria to suggest pyelonephritis.  No cough or shortness of breath.  I think it would be best for her to be tested for COVID.  Strict ER precautions reviewed.  May use over-the-counter pain relievers for now, no new prescriptions today.  She will contact me with any changes or development of new symptoms.  COVID/SARS COV-2 PCR       2. Acute midline low back pain with left-sided sciatica     3. Myalgia         Followup: pending test         Please note that this dictation was created using voice recognition software. I have worked with consultants from the vendor as well as technical experts from [employer] to optimize the interface. I have made every reasonable attempt to correct obvious errors, but I expect that there are errors of grammar and possibly content " that I did not discover before finalizing the note.

## 2020-08-01 ENCOUNTER — HOSPITAL ENCOUNTER (OUTPATIENT)
Dept: LAB | Facility: MEDICAL CENTER | Age: 30
End: 2020-08-01
Attending: NURSE PRACTITIONER
Payer: COMMERCIAL

## 2020-08-01 DIAGNOSIS — R50.9 FEVER AND CHILLS: ICD-10-CM

## 2020-08-01 LAB
COVID ORDER STATUS COVID19: NORMAL
SARS-COV-2 RNA RESP QL NAA+PROBE: NOTDETECTED
SPECIMEN SOURCE: NORMAL

## 2020-08-01 PROCEDURE — U0003 INFECTIOUS AGENT DETECTION BY NUCLEIC ACID (DNA OR RNA); SEVERE ACUTE RESPIRATORY SYNDROME CORONAVIRUS 2 (SARS-COV-2) (CORONAVIRUS DISEASE [COVID-19]), AMPLIFIED PROBE TECHNIQUE, MAKING USE OF HIGH THROUGHPUT TECHNOLOGIES AS DESCRIBED BY CMS-2020-01-R: HCPCS

## 2020-08-01 PROCEDURE — C9803 HOPD COVID-19 SPEC COLLECT: HCPCS

## 2020-08-06 ENCOUNTER — OFFICE VISIT (OUTPATIENT)
Dept: MEDICAL GROUP | Facility: MEDICAL CENTER | Age: 30
End: 2020-08-06
Payer: COMMERCIAL

## 2020-08-06 ENCOUNTER — HOSPITAL ENCOUNTER (OUTPATIENT)
Dept: RADIOLOGY | Facility: MEDICAL CENTER | Age: 30
End: 2020-08-06
Attending: NURSE PRACTITIONER
Payer: COMMERCIAL

## 2020-08-06 ENCOUNTER — HOSPITAL ENCOUNTER (OUTPATIENT)
Dept: LAB | Facility: MEDICAL CENTER | Age: 30
End: 2020-08-06
Attending: NURSE PRACTITIONER
Payer: COMMERCIAL

## 2020-08-06 VITALS
OXYGEN SATURATION: 96 % | WEIGHT: 115.74 LBS | BODY MASS INDEX: 18.6 KG/M2 | RESPIRATION RATE: 16 BRPM | SYSTOLIC BLOOD PRESSURE: 118 MMHG | HEIGHT: 66 IN | DIASTOLIC BLOOD PRESSURE: 88 MMHG | TEMPERATURE: 99.6 F | HEART RATE: 80 BPM

## 2020-08-06 DIAGNOSIS — M62.838 MUSCLE SPASM: ICD-10-CM

## 2020-08-06 DIAGNOSIS — R82.90 ABNORMAL URINE ODOR: ICD-10-CM

## 2020-08-06 DIAGNOSIS — M54.42 ACUTE MIDLINE LOW BACK PAIN WITH LEFT-SIDED SCIATICA: ICD-10-CM

## 2020-08-06 DIAGNOSIS — R50.9 FEVER, UNKNOWN ORIGIN: ICD-10-CM

## 2020-08-06 LAB
ALBUMIN SERPL BCP-MCNC: 4.2 G/DL (ref 3.2–4.9)
ALBUMIN/GLOB SERPL: 1.4 G/DL
ALP SERPL-CCNC: 51 U/L (ref 30–99)
ALT SERPL-CCNC: 12 U/L (ref 2–50)
ANION GAP SERPL CALC-SCNC: 14 MMOL/L (ref 7–16)
APPEARANCE UR: CLEAR
AST SERPL-CCNC: 20 U/L (ref 12–45)
BASOPHILS # BLD AUTO: 0.7 % (ref 0–1.8)
BASOPHILS # BLD: 0.07 K/UL (ref 0–0.12)
BILIRUB SERPL-MCNC: 0.2 MG/DL (ref 0.1–1.5)
BILIRUB UR STRIP-MCNC: NEGATIVE MG/DL
BUN SERPL-MCNC: 13 MG/DL (ref 8–22)
CALCIUM SERPL-MCNC: 9.7 MG/DL (ref 8.4–10.2)
CHLORIDE SERPL-SCNC: 97 MMOL/L (ref 96–112)
CO2 SERPL-SCNC: 24 MMOL/L (ref 20–33)
COLOR UR AUTO: YELLOW
CREAT SERPL-MCNC: 0.68 MG/DL (ref 0.5–1.4)
CRP SERPL HS-MCNC: 6.6 MG/L (ref 0–7.5)
EOSINOPHIL # BLD AUTO: 0.2 K/UL (ref 0–0.51)
EOSINOPHIL NFR BLD: 1.9 % (ref 0–6.9)
ERYTHROCYTE [DISTWIDTH] IN BLOOD BY AUTOMATED COUNT: 38.3 FL (ref 35.9–50)
ERYTHROCYTE [SEDIMENTATION RATE] IN BLOOD BY WESTERGREN METHOD: 12 MM/HOUR (ref 0–20)
GLOBULIN SER CALC-MCNC: 3 G/DL (ref 1.9–3.5)
GLUCOSE SERPL-MCNC: 88 MG/DL (ref 65–99)
GLUCOSE UR STRIP.AUTO-MCNC: NEGATIVE MG/DL
HCT VFR BLD AUTO: 40.3 % (ref 37–47)
HGB BLD-MCNC: 13.7 G/DL (ref 12–16)
IMM GRANULOCYTES # BLD AUTO: 0.15 K/UL (ref 0–0.11)
IMM GRANULOCYTES NFR BLD AUTO: 1.4 % (ref 0–0.9)
KETONES UR STRIP.AUTO-MCNC: NEGATIVE MG/DL
LEUKOCYTE ESTERASE UR QL STRIP.AUTO: NEGATIVE
LYMPHOCYTES # BLD AUTO: 2.56 K/UL (ref 1–4.8)
LYMPHOCYTES NFR BLD: 24.2 % (ref 22–41)
MCH RBC QN AUTO: 30.2 PG (ref 27–33)
MCHC RBC AUTO-ENTMCNC: 34 G/DL (ref 33.6–35)
MCV RBC AUTO: 88.8 FL (ref 81.4–97.8)
MONOCYTES # BLD AUTO: 0.78 K/UL (ref 0–0.85)
MONOCYTES NFR BLD AUTO: 7.4 % (ref 0–13.4)
NEUTROPHILS # BLD AUTO: 6.82 K/UL (ref 2–7.15)
NEUTROPHILS NFR BLD: 64.4 % (ref 44–72)
NITRITE UR QL STRIP.AUTO: NEGATIVE
NRBC # BLD AUTO: 0 K/UL
NRBC BLD-RTO: 0 /100 WBC
PH UR STRIP.AUTO: 6.5 [PH] (ref 5–8)
PLATELET # BLD AUTO: 424 K/UL (ref 164–446)
PMV BLD AUTO: 9.9 FL (ref 9–12.9)
POTASSIUM SERPL-SCNC: 4.9 MMOL/L (ref 3.6–5.5)
PROT SERPL-MCNC: 7.2 G/DL (ref 6–8.2)
PROT UR QL STRIP: NEGATIVE MG/DL
RBC # BLD AUTO: 4.54 M/UL (ref 4.2–5.4)
RBC UR QL AUTO: NEGATIVE
SARS-COV-2 AB SERPL QL IA: NORMAL
SODIUM SERPL-SCNC: 135 MMOL/L (ref 135–145)
SP GR UR STRIP.AUTO: 1.01
UROBILINOGEN UR STRIP-MCNC: NORMAL MG/DL
WBC # BLD AUTO: 10.6 K/UL (ref 4.8–10.8)

## 2020-08-06 PROCEDURE — 85652 RBC SED RATE AUTOMATED: CPT

## 2020-08-06 PROCEDURE — 72100 X-RAY EXAM L-S SPINE 2/3 VWS: CPT

## 2020-08-06 PROCEDURE — 86141 C-REACTIVE PROTEIN HS: CPT

## 2020-08-06 PROCEDURE — 80053 COMPREHEN METABOLIC PANEL: CPT

## 2020-08-06 PROCEDURE — 99214 OFFICE O/P EST MOD 30 MIN: CPT | Performed by: NURSE PRACTITIONER

## 2020-08-06 PROCEDURE — 36415 COLL VENOUS BLD VENIPUNCTURE: CPT

## 2020-08-06 PROCEDURE — 86769 SARS-COV-2 COVID-19 ANTIBODY: CPT

## 2020-08-06 PROCEDURE — 73502 X-RAY EXAM HIP UNI 2-3 VIEWS: CPT | Mod: LT

## 2020-08-06 PROCEDURE — 85025 COMPLETE CBC W/AUTO DIFF WBC: CPT

## 2020-08-06 PROCEDURE — 81002 URINALYSIS NONAUTO W/O SCOPE: CPT | Performed by: NURSE PRACTITIONER

## 2020-08-06 RX ORDER — CYCLOBENZAPRINE HCL 10 MG
5-10 TABLET ORAL NIGHTLY PRN
Qty: 15 TAB | Refills: 0 | Status: SHIPPED | OUTPATIENT
Start: 2020-08-06 | End: 2021-11-08

## 2020-08-06 RX ORDER — MELOXICAM 15 MG/1
15 TABLET ORAL
Qty: 30 TAB | Refills: 0 | Status: SHIPPED | OUTPATIENT
Start: 2020-08-06 | End: 2021-11-08

## 2020-08-06 ASSESSMENT — FIBROSIS 4 INDEX: FIB4 SCORE: 0.36

## 2020-08-07 NOTE — PROGRESS NOTES
Subjective:     Chief Complaint   Patient presents with   • Follow-Up     hip px persisting since last visit 7/30     Sheri Carbajal is a 30 y.o. female here for reevaluation of low back pain with radiation into the left hip.  She was initially seen for this 7/30/2020.  However, when she presented to the clinic at that time she was found to be febrile with temperature of 102.6.  I had referred her for COVID-19 testing which she has completed and came back normal.  She tells me that her fever persisted for 2 days and then self resolved.  She did have headache for approximately 2 days as well, that is somewhat better.  At this time she is still experiencing low appetite but her primary concern is the ongoing low back pain.  Current discomfort is 5 out of 10, aching with radiation to the posterior left hip.  Exacerbated by long periods of standing, walking, or sitting.  No history of injury.  She has been using over-the-counter pain relievers with minimal minimal relief.  She denies numbness, tingling, weakness in lower extremities.  No saddle anesthesia, change in bowel or bladder function, change in range of motion.  No family history of autoimmune conditions or rheumatoid arthritis.  She also notes that she has been drinking quite a bit of water but feels that her urine has a strong odor.  No dysuria, hematuria      Current medicines (including changes today)  Current Outpatient Medications   Medication Sig Dispense Refill   • meloxicam (MOBIC) 15 MG tablet Take 1 Tab by mouth 1 time daily as needed. 30 Tab 0   • cyclobenzaprine (FLEXERIL) 10 MG Tab Take 0.5-1 Tabs by mouth at bedtime as needed. 15 Tab 0   • naproxen (NAPROSYN) 500 MG Tab Take 1 Tab by mouth 2 times a day, with meals. 60 Tab 2   • propranolol (INDERAL) 20 MG Tab Take 1 Tab by mouth 3 times a day. 270 Tab 1   • buPROPion (WELLBUTRIN XL) 150 MG XL tablet Take 1 Tab by mouth every morning. 90 Tab 1     No current facility-administered medications for  "this visit.      She  has no past medical history on file.    ROS included above     Objective:     /88 (BP Location: Right arm, Patient Position: Sitting, BP Cuff Size: Adult)   Pulse 80   Temp 37.6 °C (99.6 °F) (Temporal)   Resp 16   Ht 1.676 m (5' 6\")   Wt 52.5 kg (115 lb 11.9 oz)   SpO2 96%  Body mass index is 18.68 kg/m².     Physical Exam:  General: Alert, oriented in no acute distress.  Eye contact is good, speech is normal, affect calm.  Lungs: clear to auscultation bilaterally, normal effort, no wheeze/ rhonchi/ rales.  CV: regular rate and rhythm, S1, S2, no murmur  Abdomen: soft, nontender  MS: Tenderness at multiple levels of lumbar spinous process as well as left SI joint.  No tenderness over the left trochanteric bursa.  Hip with full range of motion.  Strength is 5 out of 5 in distal and proximal lower extremities.  Negative straight leg raise bilaterally.  Patellar DTR 2+ bilaterally  Ext: no edema, color normal, vascularity normal, temperature normal    Assessment and Plan:   The following treatment plan was discussed   1. Fever, unknown origin   she was in clinic last week and found to be febrile on exam, unclear etiology.  It has since resolved.  She had a nasal swab for COVID-19 which was negative.  Will obtain labs as listed below.  CBC WITH DIFFERENTIAL    COVID-19 IgG, Qual    Comp Metabolic Panel    Sed Rate    CRP HIGH SENSITIVE   2. Acute midline low back pain with left-sided sciatica   new problem with no particular history of injury, persistent over the last week.  Evaluate x-ray, may start trial of meloxicam.  Risks and side effects reviewed.  Low dose of Flexeril at bedtime.  I will contact her with results, consider physical therapy.  DX-LUMBAR SPINE-2 OR 3 VIEWS    DX-HIP-COMPLETE - UNILATERAL 2+ LEFT    meloxicam (MOBIC) 15 MG tablet    cyclobenzaprine (FLEXERIL) 10 MG Tab   3. Abnormal urine odor  POCT Urinalysis-normal in clinic today, no indication of infection   4. " Muscle spasm         Followup: No follow-ups on file.         Please note that this dictation was created using voice recognition software. I have worked with consultants from the vendor as well as technical experts from [facility] to optimize the interface. I have made every reasonable attempt to correct obvious errors, but I expect that there are errors of grammar and possibly content that I did not discover before finalizing the note.

## 2020-08-11 ENCOUNTER — TELEMEDICINE (OUTPATIENT)
Dept: BEHAVIORAL HEALTH | Facility: CLINIC | Age: 30
End: 2020-08-11
Payer: COMMERCIAL

## 2020-08-11 VITALS — BODY MASS INDEX: 19.29 KG/M2 | WEIGHT: 120 LBS | HEIGHT: 66 IN

## 2020-08-11 DIAGNOSIS — F33.1 MODERATE EPISODE OF RECURRENT MAJOR DEPRESSIVE DISORDER (HCC): ICD-10-CM

## 2020-08-11 DIAGNOSIS — F41.1 GENERALIZED ANXIETY DISORDER: ICD-10-CM

## 2020-08-11 DIAGNOSIS — F43.10 PTSD (POST-TRAUMATIC STRESS DISORDER): ICD-10-CM

## 2020-08-11 PROCEDURE — 96127 BRIEF EMOTIONAL/BEHAV ASSMT: CPT | Mod: 95,CR | Performed by: PSYCHIATRY & NEUROLOGY

## 2020-08-11 PROCEDURE — 99205 OFFICE O/P NEW HI 60 MIN: CPT | Mod: 95,CR | Performed by: PSYCHIATRY & NEUROLOGY

## 2020-08-11 RX ORDER — PRAZOSIN HYDROCHLORIDE 1 MG/1
1 CAPSULE ORAL EVERY EVENING
Qty: 30 CAP | Refills: 0 | Status: SHIPPED | OUTPATIENT
Start: 2020-08-11 | End: 2020-09-03

## 2020-08-11 RX ORDER — BUPROPION HYDROCHLORIDE 150 MG/1
150 TABLET ORAL EVERY MORNING
Qty: 30 TAB | Refills: 0 | Status: SHIPPED | OUTPATIENT
Start: 2020-08-11 | End: 2020-09-03

## 2020-08-11 ASSESSMENT — ANXIETY QUESTIONNAIRES
4. TROUBLE RELAXING: MORE THAN HALF THE DAYS
2. NOT BEING ABLE TO STOP OR CONTROL WORRYING: NEARLY EVERY DAY
3. WORRYING TOO MUCH ABOUT DIFFERENT THINGS: MORE THAN HALF THE DAYS
7. FEELING AFRAID AS IF SOMETHING AWFUL MIGHT HAPPEN: MORE THAN HALF THE DAYS
6. BECOMING EASILY ANNOYED OR IRRITABLE: NEARLY EVERY DAY
5. BEING SO RESTLESS THAT IT IS HARD TO SIT STILL: MORE THAN HALF THE DAYS
GAD7 TOTAL SCORE: 17
1. FEELING NERVOUS, ANXIOUS, OR ON EDGE: NEARLY EVERY DAY

## 2020-08-11 ASSESSMENT — FIBROSIS 4 INDEX: FIB4 SCORE: 0.38

## 2020-08-11 ASSESSMENT — PATIENT HEALTH QUESTIONNAIRE - PHQ9
SUM OF ALL RESPONSES TO PHQ QUESTIONS 1-9: 13
CLINICAL INTERPRETATION OF PHQ2 SCORE: 3
5. POOR APPETITE OR OVEREATING: 1 - SEVERAL DAYS
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
## 2020-09-03 DIAGNOSIS — F43.10 PTSD (POST-TRAUMATIC STRESS DISORDER): ICD-10-CM

## 2020-09-03 DIAGNOSIS — F33.1 MODERATE EPISODE OF RECURRENT MAJOR DEPRESSIVE DISORDER (HCC): ICD-10-CM

## 2020-09-03 RX ORDER — PRAZOSIN HYDROCHLORIDE 1 MG/1
CAPSULE ORAL
Qty: 30 CAP | Refills: 0 | Status: SHIPPED | OUTPATIENT
Start: 2020-09-03 | End: 2020-09-09 | Stop reason: SINTOL

## 2020-09-03 RX ORDER — BUPROPION HYDROCHLORIDE 150 MG/1
TABLET ORAL
Qty: 30 TAB | Refills: 0 | Status: SHIPPED | OUTPATIENT
Start: 2020-09-03 | End: 2020-09-09 | Stop reason: SDUPTHER

## 2020-09-09 ENCOUNTER — TELEMEDICINE (OUTPATIENT)
Dept: BEHAVIORAL HEALTH | Facility: CLINIC | Age: 30
End: 2020-09-09
Payer: COMMERCIAL

## 2020-09-09 VITALS — HEIGHT: 66 IN | BODY MASS INDEX: 19.29 KG/M2 | WEIGHT: 120 LBS

## 2020-09-09 DIAGNOSIS — F43.10 PTSD (POST-TRAUMATIC STRESS DISORDER): ICD-10-CM

## 2020-09-09 DIAGNOSIS — F33.1 MODERATE EPISODE OF RECURRENT MAJOR DEPRESSIVE DISORDER (HCC): ICD-10-CM

## 2020-09-09 DIAGNOSIS — F41.1 GENERALIZED ANXIETY DISORDER: ICD-10-CM

## 2020-09-09 PROCEDURE — 99214 OFFICE O/P EST MOD 30 MIN: CPT | Mod: 95,CR | Performed by: PSYCHIATRY & NEUROLOGY

## 2020-09-09 RX ORDER — BUPROPION HYDROCHLORIDE 150 MG/1
150 TABLET ORAL EVERY MORNING
Qty: 30 TAB | Refills: 0 | Status: SHIPPED | OUTPATIENT
Start: 2020-09-09 | End: 2020-10-02

## 2020-09-09 RX ORDER — DOXAZOSIN MESYLATE 1 MG/1
1 TABLET ORAL
Qty: 30 TAB | Refills: 0 | Status: SHIPPED | OUTPATIENT
Start: 2020-09-09 | End: 2020-10-02

## 2020-09-09 ASSESSMENT — FIBROSIS 4 INDEX: FIB4 SCORE: 0.38
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
## 2020-09-25 ENCOUNTER — HOSPITAL ENCOUNTER (OUTPATIENT)
Dept: LAB | Facility: MEDICAL CENTER | Age: 30
End: 2020-09-25
Payer: COMMERCIAL

## 2020-10-02 DIAGNOSIS — F41.1 GENERALIZED ANXIETY DISORDER: ICD-10-CM

## 2020-10-02 DIAGNOSIS — F33.1 MODERATE EPISODE OF RECURRENT MAJOR DEPRESSIVE DISORDER (HCC): ICD-10-CM

## 2020-10-02 DIAGNOSIS — F43.10 PTSD (POST-TRAUMATIC STRESS DISORDER): ICD-10-CM

## 2020-10-02 RX ORDER — BUPROPION HYDROCHLORIDE 150 MG/1
TABLET ORAL
Qty: 30 TAB | Refills: 0 | Status: SHIPPED | OUTPATIENT
Start: 2020-10-02 | End: 2021-11-08

## 2020-10-02 RX ORDER — DOXAZOSIN MESYLATE 1 MG/1
TABLET ORAL
Qty: 30 TAB | Refills: 0 | Status: SHIPPED | OUTPATIENT
Start: 2020-10-02 | End: 2020-10-12

## 2020-10-07 ENCOUNTER — OFFICE VISIT (OUTPATIENT)
Dept: URGENT CARE | Facility: CLINIC | Age: 30
End: 2020-10-07
Payer: COMMERCIAL

## 2020-10-07 ENCOUNTER — HOSPITAL ENCOUNTER (OUTPATIENT)
Facility: MEDICAL CENTER | Age: 30
End: 2020-10-07
Attending: NURSE PRACTITIONER
Payer: COMMERCIAL

## 2020-10-07 VITALS
BODY MASS INDEX: 18.16 KG/M2 | TEMPERATURE: 99.7 F | DIASTOLIC BLOOD PRESSURE: 70 MMHG | SYSTOLIC BLOOD PRESSURE: 110 MMHG | HEART RATE: 88 BPM | WEIGHT: 113 LBS | HEIGHT: 66 IN | OXYGEN SATURATION: 99 % | RESPIRATION RATE: 20 BRPM

## 2020-10-07 DIAGNOSIS — R09.82 POST-NASAL DRIP: ICD-10-CM

## 2020-10-07 DIAGNOSIS — Z20.822 EXPOSURE TO COVID-19 VIRUS: ICD-10-CM

## 2020-10-07 DIAGNOSIS — R06.02 SHORTNESS OF BREATH: ICD-10-CM

## 2020-10-07 DIAGNOSIS — R05.9 COUGH: ICD-10-CM

## 2020-10-07 PROCEDURE — U0003 INFECTIOUS AGENT DETECTION BY NUCLEIC ACID (DNA OR RNA); SEVERE ACUTE RESPIRATORY SYNDROME CORONAVIRUS 2 (SARS-COV-2) (CORONAVIRUS DISEASE [COVID-19]), AMPLIFIED PROBE TECHNIQUE, MAKING USE OF HIGH THROUGHPUT TECHNOLOGIES AS DESCRIBED BY CMS-2020-01-R: HCPCS

## 2020-10-07 PROCEDURE — 99214 OFFICE O/P EST MOD 30 MIN: CPT | Mod: CS | Performed by: NURSE PRACTITIONER

## 2020-10-07 RX ORDER — OXYMETAZOLINE HYDROCHLORIDE 0.05 G/100ML
2 SPRAY NASAL 2 TIMES DAILY
COMMUNITY
End: 2021-08-12

## 2020-10-07 RX ORDER — LORATADINE 10 MG/1
1 CAPSULE, LIQUID FILLED ORAL DAILY
Qty: 30 CAP | Refills: 0 | Status: SHIPPED | OUTPATIENT
Start: 2020-10-07 | End: 2020-11-06

## 2020-10-07 RX ORDER — ALBUTEROL SULFATE 90 UG/1
2 AEROSOL, METERED RESPIRATORY (INHALATION) EVERY 6 HOURS PRN
Qty: 8.5 G | Refills: 0 | Status: SHIPPED | OUTPATIENT
Start: 2020-10-07 | End: 2021-11-08

## 2020-10-07 RX ORDER — FLUTICASONE PROPIONATE 50 MCG
1 SPRAY, SUSPENSION (ML) NASAL DAILY
Qty: 16 G | Refills: 0 | Status: SHIPPED | OUTPATIENT
Start: 2020-10-07 | End: 2021-11-08

## 2020-10-07 ASSESSMENT — ENCOUNTER SYMPTOMS
PALPITATIONS: 0
VOMITING: 0
ABDOMINAL PAIN: 0
FEVER: 0
DIARRHEA: 0
WHEEZING: 0
SORE THROAT: 0
DIZZINESS: 0
HEADACHES: 0
MYALGIAS: 0
NAUSEA: 0
SHORTNESS OF BREATH: 1
COUGH: 1
CHILLS: 0

## 2020-10-07 ASSESSMENT — FIBROSIS 4 INDEX: FIB4 SCORE: 0.38

## 2020-10-07 NOTE — LETTER
October 7, 2020         Patient: Sheri Carbajal   YOB: 1990   Date of Visit: 10/7/2020           To Whom it May Concern:    Sheri Carbajal was seen in my clinic on 10/7/2020. Please excuse her from work     If you have any questions or concerns, please don't hesitate to call.        Sincerely,           DIXON Angeles.  Electronically Signed

## 2020-10-07 NOTE — PROGRESS NOTES
Subjective:   Sheri Carbajal  is a 30 y.o. female who presents for Cough (shortness of breath as she has coughing fits, chest congestion, chest feels heavy x 3 days)        HPI   30-year-old female patient reports urgent care for new problem that started 3 days ago.  Patient states he had a positive exposure at work has been tested for COVID but her symptoms have persisted.  Patient states she has a dry cough as well as chest congestion and heaviness as well as shortness of breath.  Patient denies seasonal allergies denies wheezing, difficulty breathing, fever, chills, headache, change in taste or smell, nausea, vomiting, diarrhea.  Has been taking over-the-counter Mucinex with no relief of symptoms.  Denies history of asthma.  Review of Systems   Constitutional: Negative for chills, fever and malaise/fatigue.   HENT: Negative for congestion and sore throat.    Respiratory: Positive for cough and shortness of breath. Negative for wheezing.    Cardiovascular: Negative for chest pain and palpitations.   Gastrointestinal: Negative for abdominal pain, diarrhea, nausea and vomiting.   Musculoskeletal: Negative for myalgias.   Skin: Negative for itching and rash.   Neurological: Negative for dizziness and headaches.     History reviewed. No pertinent past medical history.   Past Surgical History:   Procedure Laterality Date   • TONSILLECTOMY        Social History     Socioeconomic History   • Marital status:      Spouse name: Not on file   • Number of children: Not on file   • Years of education: Not on file   • Highest education level: Not on file   Occupational History   • Not on file   Social Needs   • Financial resource strain: Not on file   • Food insecurity     Worry: Not on file     Inability: Not on file   • Transportation needs     Medical: Not on file     Non-medical: Not on file   Tobacco Use   • Smoking status: Former Smoker     Years: 12.00     Types: Cigarettes     Start date: 2006   • Smokeless  "tobacco: Never Used   • Tobacco comment: 3-4 cigarettes / day   Substance and Sexual Activity   • Alcohol use: Not Currently     Alcohol/week: 6.0 oz     Types: 10 Cans of beer per week     Comment: weekends    • Drug use: No   • Sexual activity: Yes     Partners: Male     Birth control/protection: I.U.D.     Comment: Single, Work as Customer service    Lifestyle   • Physical activity     Days per week: Not on file     Minutes per session: Not on file   • Stress: Not on file   Relationships   • Social connections     Talks on phone: Not on file     Gets together: Not on file     Attends Mosque service: Not on file     Active member of club or organization: Not on file     Attends meetings of clubs or organizations: Not on file     Relationship status: Not on file   • Intimate partner violence     Fear of current or ex partner: Not on file     Emotionally abused: Not on file     Physically abused: Not on file     Forced sexual activity: Not on file   Other Topics Concern   • Not on file   Social History Narrative   • Not on file    Patient has no known allergies.       Objective:   /70 (BP Location: Right arm, Patient Position: Sitting, BP Cuff Size: Small adult)   Pulse 88   Temp 37.6 °C (99.7 °F) (Temporal)   Resp 20   Ht 1.676 m (5' 6\")   Wt 51.3 kg (113 lb)   LMP  (LMP Unknown)   SpO2 99%   BMI 18.24 kg/m²   Physical Exam  Vitals signs reviewed.   Constitutional:       Appearance: Normal appearance.   HENT:      Right Ear: Tympanic membrane, ear canal and external ear normal.      Left Ear: Tympanic membrane, ear canal and external ear normal.      Mouth/Throat:      Mouth: Mucous membranes are moist.      Comments: Post nasal drip noted  Cardiovascular:      Rate and Rhythm: Normal rate and regular rhythm.      Heart sounds: Normal heart sounds.   Pulmonary:      Effort: Pulmonary effort is normal.      Breath sounds: Normal breath sounds.   Skin:     General: Skin is warm.   Neurological:      " Mental Status: She is alert and oriented to person, place, and time.   Psychiatric:         Mood and Affect: Mood normal.         Behavior: Behavior normal.         Thought Content: Thought content normal.         Judgment: Judgment normal.           Assessment/Plan:        1. Shortness of breath  albuterol 108 (90 Base) MCG/ACT Aero Soln inhalation aerosol   2. Exposure to COVID-19 virus  COVID/SARS COV-2 PCR   3. Post-nasal drip  fluticasone (FLONASE) 50 MCG/ACT nasal spray    Loratadine (CLARITIN) 10 MG Cap   4. Cough  fluticasone (FLONASE) 50 MCG/ACT nasal spray    Loratadine (CLARITIN) 10 MG Cap     Discussed physical examination as well as patient presentation and positive exposure could be viral and could be COVID-19.  Will perform COVID 19 swab and call patient with results.  Advised patient to remain in self-isolation until she is cleared by healthcare provider or health department if she is positive.  Discussed supportive care including over-the-counter NSAIDs and Tylenol per 's instructions, increase fluids using electrolyte enriched beverages and using the inhaler prescribed today for shortness of breath.  Strict ER precautions provided for patient for severe nausea, vomiting, diarrhea with inability to main hydration status, shortness of breath, wheezing, difficulty breathing or high feve unable to be controlled by over-the-counter medications.    Supportive care, differential diagnoses, and indications for immediate follow-up discussed with patient.    Pathogenesis of diagnosis discussed including typical length and natural progression. Patient expresses understanding and agrees to plan.    Instructed patient to return to clinic for worsening symptoms or symptoms that persist for 7 to 10 days    Work note provided    Appropriate PPE worn at all times by provider. Patient had face mask on for entirety of visit other than during oropharyngeal examination    Please note that this dictation was  created using voice recognition software. I have made every reasonable attempt to correct obvious errors, but I expect that there are errors of grammar and possibly content that I did not discover before finalizing the note.    Note electronically signed by HOWARD Knowles

## 2020-10-12 DIAGNOSIS — F43.10 PTSD (POST-TRAUMATIC STRESS DISORDER): ICD-10-CM

## 2020-10-12 RX ORDER — DOXAZOSIN MESYLATE 1 MG/1
TABLET ORAL
Qty: 90 TAB | Refills: 1 | Status: SHIPPED | OUTPATIENT
Start: 2020-10-12 | End: 2020-12-15 | Stop reason: SDUPTHER

## 2020-10-29 ENCOUNTER — HOSPITAL ENCOUNTER (OUTPATIENT)
Facility: MEDICAL CENTER | Age: 30
End: 2020-10-29
Attending: NURSE PRACTITIONER
Payer: COMMERCIAL

## 2020-10-29 ENCOUNTER — OFFICE VISIT (OUTPATIENT)
Dept: MEDICAL GROUP | Facility: MEDICAL CENTER | Age: 30
End: 2020-10-29
Payer: COMMERCIAL

## 2020-10-29 VITALS
TEMPERATURE: 99.2 F | OXYGEN SATURATION: 97 % | SYSTOLIC BLOOD PRESSURE: 132 MMHG | HEIGHT: 66 IN | HEART RATE: 98 BPM | BODY MASS INDEX: 19.13 KG/M2 | RESPIRATION RATE: 17 BRPM | DIASTOLIC BLOOD PRESSURE: 90 MMHG | WEIGHT: 119.05 LBS

## 2020-10-29 DIAGNOSIS — F41.1 GENERALIZED ANXIETY DISORDER: ICD-10-CM

## 2020-10-29 DIAGNOSIS — R63.4 UNEXPLAINED WEIGHT LOSS: ICD-10-CM

## 2020-10-29 DIAGNOSIS — N89.8 VAGINAL IRRITATION: ICD-10-CM

## 2020-10-29 DIAGNOSIS — Z23 NEED FOR INFLUENZA VACCINATION: ICD-10-CM

## 2020-10-29 LAB
APPEARANCE UR: NORMAL
BILIRUB UR STRIP-MCNC: NORMAL MG/DL
COLOR UR AUTO: YELLOW
GLUCOSE UR STRIP.AUTO-MCNC: NORMAL MG/DL
KETONES UR STRIP.AUTO-MCNC: NORMAL MG/DL
LEUKOCYTE ESTERASE UR QL STRIP.AUTO: NORMAL
NITRITE UR QL STRIP.AUTO: NORMAL
PH UR STRIP.AUTO: 7 [PH] (ref 5–8)
PROT UR QL STRIP: NORMAL MG/DL
RBC UR QL AUTO: NORMAL
SP GR UR STRIP.AUTO: 1.02
UROBILINOGEN UR STRIP-MCNC: 0.2 MG/DL

## 2020-10-29 PROCEDURE — 81002 URINALYSIS NONAUTO W/O SCOPE: CPT | Performed by: NURSE PRACTITIONER

## 2020-10-29 PROCEDURE — 87480 CANDIDA DNA DIR PROBE: CPT

## 2020-10-29 PROCEDURE — 87660 TRICHOMONAS VAGIN DIR PROBE: CPT

## 2020-10-29 PROCEDURE — 90686 IIV4 VACC NO PRSV 0.5 ML IM: CPT | Performed by: NURSE PRACTITIONER

## 2020-10-29 PROCEDURE — 87510 GARDNER VAG DNA DIR PROBE: CPT

## 2020-10-29 PROCEDURE — 90471 IMMUNIZATION ADMIN: CPT | Performed by: NURSE PRACTITIONER

## 2020-10-29 PROCEDURE — 99214 OFFICE O/P EST MOD 30 MIN: CPT | Mod: 25 | Performed by: NURSE PRACTITIONER

## 2020-10-29 RX ORDER — FLUCONAZOLE 150 MG/1
TABLET ORAL
Qty: 2 TAB | Refills: 0 | Status: SHIPPED | OUTPATIENT
Start: 2020-10-29 | End: 2021-11-08

## 2020-10-29 ASSESSMENT — FIBROSIS 4 INDEX: FIB4 SCORE: 0.38

## 2020-10-30 PROBLEM — R63.4 UNEXPLAINED WEIGHT LOSS: Status: ACTIVE | Noted: 2020-10-30

## 2020-10-30 NOTE — ASSESSMENT & PLAN NOTE
Concerned about gradual weight loss over the last several months, down about 10 lb from her usual weight. No changes in diet or exercise regimen. States her appetite is normal, she is eating well, not skipping meals. No restrictive dietary practices or excessive exercise. No hot flashes, diarrhea, chronic cough or sob. No abd pain, nausea, vomiting, fever, chills

## 2020-10-30 NOTE — ASSESSMENT & PLAN NOTE
"States that her anxiety continues to be \"terrible\". Working with psych on medication adjustment. Continues with wellbutrin, recently added zoloft.  May be contributing to her weight loss  "

## 2020-10-30 NOTE — PROGRESS NOTES
"Subjective:     Chief Complaint   Patient presents with   • Vaginitis   • Weight Loss     Sheri Carbajal is a 30 y.o. female here today to follow up on:    Generalized anxiety disorder  States that her anxiety continues to be \"terrible\". Working with psych on medication adjustment. Continues with wellbutrin, recently added zoloft.  May be contributing to her weight loss    Vaginal irritation  Recurrent difficulty with vaginal itching, irritation. No aggravating factors. She has attempted OTC yeast treatments several times but symptoms only improve briefly to return later. No h/o diabetes. Not using any lubricants or fragranced soaps. No concerns with STDs    Unexplained weight loss  Concerned about gradual weight loss over the last several months, down about 10 lb from her usual weight. No changes in diet or exercise regimen. States her appetite is normal, she is eating well, not skipping meals. No restrictive dietary practices or excessive exercise. No hot flashes, diarrhea, chronic cough or sob. No abd pain, nausea, vomiting, fever, chills  She does have uncontrolled anxiety and depression and reports feeling very stressed which may be contributing     Current medicines (including changes today)  Current Outpatient Medications   Medication Sig Dispense Refill   • fluconazole (DIFLUCAN) 150 MG tablet 1 tab PO today. May repeat dose in 3 days if needed 2 Tab 0   • doxazosin (CARDURA) 1 MG Tab TAKE 1 TABLET BY MOUTH EVERYDAY AT BEDTIME 90 Tab 1   • oxymetazoline (AFRIN) 0.05 % Solution Spray 2 Sprays in nose 2 times a day.     • Phenylephrine-DM-GG (MUCINEX CONGEST & COUGH CHILD PO) Take  by mouth.     • albuterol 108 (90 Base) MCG/ACT Aero Soln inhalation aerosol Inhale 2 Puffs by mouth every 6 hours as needed for Shortness of Breath. 8.5 g 0   • fluticasone (FLONASE) 50 MCG/ACT nasal spray Spray 1 Spray in nose every day. 16 g 0   • Loratadine (CLARITIN) 10 MG Cap Take 1 Cap by mouth every day for 30 days. 30 Cap " "0   • buPROPion (WELLBUTRIN XL) 150 MG XL tablet TAKE 1 TABLET BY MOUTH EVERY DAY IN THE MORNING 30 Tab 0   • sertraline (ZOLOFT) 50 MG Tab TAKE 1 TABLET BY MOUTH EVERY DAY 30 Tab 0   • meloxicam (MOBIC) 15 MG tablet Take 1 Tab by mouth 1 time daily as needed. 30 Tab 0   • cyclobenzaprine (FLEXERIL) 10 MG Tab Take 0.5-1 Tabs by mouth at bedtime as needed. 15 Tab 0   • naproxen (NAPROSYN) 500 MG Tab Take 1 Tab by mouth 2 times a day, with meals. 60 Tab 2     No current facility-administered medications for this visit.      She  has no past medical history on file.    ROS included above     Objective:     /90 (BP Location: Left arm, Patient Position: Sitting, BP Cuff Size: Adult)   Pulse 98   Temp 37.3 °C (99.2 °F) (Temporal)   Resp 17   Ht 1.676 m (5' 6\")   Wt 54 kg (119 lb 0.8 oz)   SpO2 97%  Body mass index is 19.21 kg/m².     Physical Exam:  General: Alert, oriented in no acute distress.  Eye contact is good, speech is normal, affect calm  HEENT: Oral mucosa pink moist, no lesions. TMs gray with good landmarks bilaterally. No lymphadenopathy.  Lungs: clear to auscultation bilaterally, normal effort, no wheeze/ rhonchi/ rales.  CV: regular rate and rhythm, S1, S2, no murmur  Abdomen: soft, nontender, No CVAT, no hepatosplenomegaly  Ext: no edema, color normal, vascularity normal, temperature normal    Assessment and Plan:   The following treatment plan was discussed   1. Unexplained weight loss  Down about 10 lb from her typical weight with no change in diet or exercise. Reports good appetite, normal energy. No recent illness.  Suspect this is r/t there uncontrolled anxiety and depression. We will evaluate labs to rule out other contributing causes.  CBC WITH DIFFERENTIAL    ZURDO REFLEXIVE PROFILE    Sed Rate    [id number]    Comp Metabolic Panel    POCT Urinalysis    Comp Metabolic Panel    HEMOGLOBIN A1C    TSH WITH REFLEX TO FT4   2. Vaginal irritation  Swab sent for vaginal pathogens. Will try oral " diflucan, f/u pending results  VAGINAL PATHOGENS DNA PANEL    fluconazole (DIFLUCAN) 150 MG tablet   3. Generalized anxiety disorder  She will continue to f/u with psychiatry   4. Need for influenza vaccination  I have placed the below orders and discussed them with an approved delegating provider. The MA is performing the below orders under the direction of Dr. Nair  Influenza Vaccine Quad Injection (PF)       Followup: pending labs         Please note that this dictation was created using voice recognition software. I have worked with consultants from the vendor as well as technical experts from [facility] [employer] to optimize the interface. I have made every reasonable attempt to correct obvious errors, but I expect that there are errors of grammar and possibly content that I did not discover before finalizing the note.

## 2020-10-30 NOTE — ASSESSMENT & PLAN NOTE
Recurrent difficulty with vaginal itching, irritation. No aggravating factors. She has attempted OTC yeast treatments several times but symptoms only improve briefly to return later. No h/o diabetes. Not using any lubricants or fragranced soaps. No concerns with STDs

## 2020-11-02 DIAGNOSIS — F43.10 PTSD (POST-TRAUMATIC STRESS DISORDER): ICD-10-CM

## 2020-11-02 DIAGNOSIS — F41.1 GENERALIZED ANXIETY DISORDER: ICD-10-CM

## 2020-11-02 DIAGNOSIS — F33.1 MODERATE EPISODE OF RECURRENT MAJOR DEPRESSIVE DISORDER (HCC): ICD-10-CM

## 2020-11-03 DIAGNOSIS — N89.8 VAGINAL IRRITATION: ICD-10-CM

## 2020-11-03 LAB
AMBIGUOUS DTTM AMBI4: NORMAL
CANDIDA DNA VAG QL PROBE+SIG AMP: NEGATIVE
G VAGINALIS DNA VAG QL PROBE+SIG AMP: NEGATIVE
SIGNIFICANT IND 70042: NORMAL
SITE SITE: NORMAL
SOURCE SOURCE: NORMAL
T VAGINALIS DNA VAG QL PROBE+SIG AMP: NEGATIVE

## 2020-11-10 ENCOUNTER — PATIENT MESSAGE (OUTPATIENT)
Dept: MEDICAL GROUP | Facility: MEDICAL CENTER | Age: 30
End: 2020-11-10

## 2020-11-10 DIAGNOSIS — N89.8 VAGINAL IRRITATION: ICD-10-CM

## 2020-11-11 ENCOUNTER — HOSPITAL ENCOUNTER (OUTPATIENT)
Dept: LAB | Facility: MEDICAL CENTER | Age: 30
End: 2020-11-11
Attending: NURSE PRACTITIONER
Payer: COMMERCIAL

## 2020-11-11 DIAGNOSIS — R63.4 UNEXPLAINED WEIGHT LOSS: ICD-10-CM

## 2020-11-11 LAB
ALBUMIN SERPL BCP-MCNC: 4.7 G/DL (ref 3.2–4.9)
ALBUMIN/GLOB SERPL: 2 G/DL
ALP SERPL-CCNC: 47 U/L (ref 30–99)
ALT SERPL-CCNC: 16 U/L (ref 2–50)
ANION GAP SERPL CALC-SCNC: 10 MMOL/L (ref 7–16)
AST SERPL-CCNC: 13 U/L (ref 12–45)
BASOPHILS # BLD AUTO: 0.6 % (ref 0–1.8)
BASOPHILS # BLD: 0.04 K/UL (ref 0–0.12)
BILIRUB SERPL-MCNC: 0.6 MG/DL (ref 0.1–1.5)
BUN SERPL-MCNC: 14 MG/DL (ref 8–22)
CALCIUM SERPL-MCNC: 9.3 MG/DL (ref 8.5–10.5)
CHLORIDE SERPL-SCNC: 100 MMOL/L (ref 96–112)
CO2 SERPL-SCNC: 24 MMOL/L (ref 20–33)
CREAT SERPL-MCNC: 0.72 MG/DL (ref 0.5–1.4)
EOSINOPHIL # BLD AUTO: 0.15 K/UL (ref 0–0.51)
EOSINOPHIL NFR BLD: 2.3 % (ref 0–6.9)
ERYTHROCYTE [DISTWIDTH] IN BLOOD BY AUTOMATED COUNT: 41.5 FL (ref 35.9–50)
EST. AVERAGE GLUCOSE BLD GHB EST-MCNC: 105 MG/DL
FASTING STATUS PATIENT QL REPORTED: NORMAL
GLOBULIN SER CALC-MCNC: 2.4 G/DL (ref 1.9–3.5)
GLUCOSE SERPL-MCNC: 74 MG/DL (ref 65–99)
HBA1C MFR BLD: 5.3 % (ref 0–5.6)
HCT VFR BLD AUTO: 47.4 % (ref 37–47)
HGB BLD-MCNC: 16 G/DL (ref 12–16)
IMM GRANULOCYTES # BLD AUTO: 0.01 K/UL (ref 0–0.11)
IMM GRANULOCYTES NFR BLD AUTO: 0.2 % (ref 0–0.9)
LYMPHOCYTES # BLD AUTO: 1.63 K/UL (ref 1–4.8)
LYMPHOCYTES NFR BLD: 25.5 % (ref 22–41)
MCH RBC QN AUTO: 31.2 PG (ref 27–33)
MCHC RBC AUTO-ENTMCNC: 33.8 G/DL (ref 33.6–35)
MCV RBC AUTO: 92.4 FL (ref 81.4–97.8)
MONOCYTES # BLD AUTO: 0.55 K/UL (ref 0–0.85)
MONOCYTES NFR BLD AUTO: 8.6 % (ref 0–13.4)
NEUTROPHILS # BLD AUTO: 4.01 K/UL (ref 2–7.15)
NEUTROPHILS NFR BLD: 62.8 % (ref 44–72)
NRBC # BLD AUTO: 0 K/UL
NRBC BLD-RTO: 0 /100 WBC
PLATELET # BLD AUTO: 278 K/UL (ref 164–446)
PMV BLD AUTO: 11 FL (ref 9–12.9)
POTASSIUM SERPL-SCNC: 4.1 MMOL/L (ref 3.6–5.5)
PROT SERPL-MCNC: 7.1 G/DL (ref 6–8.2)
RBC # BLD AUTO: 5.13 M/UL (ref 4.2–5.4)
SODIUM SERPL-SCNC: 134 MMOL/L (ref 135–145)
WBC # BLD AUTO: 6.4 K/UL (ref 4.8–10.8)

## 2020-11-11 PROCEDURE — 85025 COMPLETE CBC W/AUTO DIFF WBC: CPT

## 2020-11-11 PROCEDURE — 86038 ANTINUCLEAR ANTIBODIES: CPT

## 2020-11-11 PROCEDURE — 86039 ANTINUCLEAR ANTIBODIES (ANA): CPT

## 2020-11-11 PROCEDURE — 86812 HLA TYPING A B OR C: CPT

## 2020-11-11 PROCEDURE — 83036 HEMOGLOBIN GLYCOSYLATED A1C: CPT

## 2020-11-11 PROCEDURE — 85652 RBC SED RATE AUTOMATED: CPT

## 2020-11-11 PROCEDURE — 84443 ASSAY THYROID STIM HORMONE: CPT

## 2020-11-11 PROCEDURE — 80053 COMPREHEN METABOLIC PANEL: CPT

## 2020-11-11 PROCEDURE — 36415 COLL VENOUS BLD VENIPUNCTURE: CPT

## 2020-11-12 LAB
ERYTHROCYTE [SEDIMENTATION RATE] IN BLOOD BY WESTERGREN METHOD: 1 MM/HOUR (ref 0–20)
TSH SERPL DL<=0.005 MIU/L-ACNC: 1.47 UIU/ML (ref 0.38–5.33)

## 2020-11-13 LAB
HLA-B27 QL FC: NEGATIVE
NUCLEAR IGG SER QL IA: DETECTED

## 2020-11-14 LAB
ANA INTERPRETIVE COMMENT Q5143: NORMAL
ANTINUCLEAR ANTIBODY (ANA), HEP-2, IGG Q5142: NORMAL

## 2020-11-19 ENCOUNTER — HOSPITAL ENCOUNTER (OUTPATIENT)
Dept: LAB | Facility: MEDICAL CENTER | Age: 30
End: 2020-11-19
Payer: COMMERCIAL

## 2020-11-20 LAB — COVID ORDER STATUS COVID19: NORMAL

## 2020-11-21 LAB
SARS-COV-2 RNA RESP QL NAA+PROBE: NOTDETECTED
SPECIMEN SOURCE: NORMAL

## 2020-11-25 ENCOUNTER — PATIENT MESSAGE (OUTPATIENT)
Dept: MEDICAL GROUP | Facility: MEDICAL CENTER | Age: 30
End: 2020-11-25

## 2020-11-25 NOTE — TELEPHONE ENCOUNTER
From: Sheri Carbajal  To: ATILIO Polanco  Sent: 11/25/2020 2:00 PM PST  Subject: Non-Urgent Medical Question    Inessa Villanueva, they don’t have any appointments available until the new year. Is there anything else I can do before that? I’m very frustrated being so uncomfortable 90% of the time. Thank you      ----- Message -----   From:ATILIO Polanco   Sent:11/11/2020 8:09 AM PST   To:Sheri Carbajal   Subject:RE: Non-Urgent Medical Question    Yes, I do think you should see an OB/GYN. I will place a referral for you. They should call you to schedule an appointment. I do not think that the second dose of medication is going to help you as no yeast showed up in your sample.  Arminda LAWRENCE      ----- Message -----   From:Sheri Carbajal   Sent:11/10/2020 5:11 PM PST   To:ATILIO Polanco   Subject:Non-Urgent Medical Question    Sachin Hilliard, I am still having some symptoms of a yeast infection- itchy, dryness, and some white discharge. I haven't taken the second dose of the medication you prescribed yet. Should I get a referral for a gynecologist?

## 2020-11-30 ENCOUNTER — HOSPITAL ENCOUNTER (OUTPATIENT)
Dept: LAB | Facility: MEDICAL CENTER | Age: 30
End: 2020-11-30
Attending: PATHOLOGY
Payer: COMMERCIAL

## 2020-11-30 PROCEDURE — U0003 INFECTIOUS AGENT DETECTION BY NUCLEIC ACID (DNA OR RNA); SEVERE ACUTE RESPIRATORY SYNDROME CORONAVIRUS 2 (SARS-COV-2) (CORONAVIRUS DISEASE [COVID-19]), AMPLIFIED PROBE TECHNIQUE, MAKING USE OF HIGH THROUGHPUT TECHNOLOGIES AS DESCRIBED BY CMS-2020-01-R: HCPCS

## 2020-12-01 LAB — COVID ORDER STATUS COVID19: NORMAL

## 2020-12-02 LAB
SARS-COV-2 RNA RESP QL NAA+PROBE: NOTDETECTED
SPECIMEN SOURCE: NORMAL

## 2020-12-08 DIAGNOSIS — F41.1 GENERALIZED ANXIETY DISORDER: ICD-10-CM

## 2020-12-08 DIAGNOSIS — F33.1 MODERATE EPISODE OF RECURRENT MAJOR DEPRESSIVE DISORDER (HCC): ICD-10-CM

## 2020-12-08 DIAGNOSIS — F43.10 PTSD (POST-TRAUMATIC STRESS DISORDER): ICD-10-CM

## 2020-12-15 ENCOUNTER — TELEMEDICINE (OUTPATIENT)
Dept: BEHAVIORAL HEALTH | Facility: CLINIC | Age: 30
End: 2020-12-15
Payer: COMMERCIAL

## 2020-12-15 DIAGNOSIS — F41.1 GENERALIZED ANXIETY DISORDER: ICD-10-CM

## 2020-12-15 DIAGNOSIS — F43.10 PTSD (POST-TRAUMATIC STRESS DISORDER): ICD-10-CM

## 2020-12-15 DIAGNOSIS — F33.1 MODERATE EPISODE OF RECURRENT MAJOR DEPRESSIVE DISORDER (HCC): Primary | ICD-10-CM

## 2020-12-15 PROCEDURE — 99214 OFFICE O/P EST MOD 30 MIN: CPT | Mod: 95,CR | Performed by: PSYCHIATRY & NEUROLOGY

## 2020-12-15 PROCEDURE — 90833 PSYTX W PT W E/M 30 MIN: CPT | Mod: 95,CR | Performed by: PSYCHIATRY & NEUROLOGY

## 2020-12-15 RX ORDER — DOXAZOSIN MESYLATE 1 MG/1
TABLET ORAL
Qty: 30 TAB | Refills: 0 | Status: SHIPPED | OUTPATIENT
Start: 2020-12-15 | End: 2021-11-08
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
## 2020-12-18 DIAGNOSIS — F41.1 GENERALIZED ANXIETY DISORDER: ICD-10-CM

## 2020-12-18 RX ORDER — GABAPENTIN 100 MG/1
100 CAPSULE ORAL 2 TIMES DAILY
Qty: 90 CAP | Refills: 1 | Status: SHIPPED | OUTPATIENT
Start: 2020-12-18 | End: 2021-11-08

## 2020-12-24 ENCOUNTER — APPOINTMENT (OUTPATIENT)
Dept: MEDICAL GROUP | Facility: MEDICAL CENTER | Age: 30
End: 2020-12-24
Payer: COMMERCIAL

## 2021-05-07 ENCOUNTER — APPOINTMENT (RX ONLY)
Dept: URBAN - METROPOLITAN AREA CLINIC 4 | Facility: CLINIC | Age: 31
Setting detail: DERMATOLOGY
End: 2021-05-07

## 2021-05-07 DIAGNOSIS — L73.8 OTHER SPECIFIED FOLLICULAR DISORDERS: ICD-10-CM

## 2021-05-07 DIAGNOSIS — Z71.89 OTHER SPECIFIED COUNSELING: ICD-10-CM

## 2021-05-07 DIAGNOSIS — I78.8 OTHER DISEASES OF CAPILLARIES: ICD-10-CM

## 2021-05-07 DIAGNOSIS — D22 MELANOCYTIC NEVI: ICD-10-CM

## 2021-05-07 PROBLEM — D22.39 MELANOCYTIC NEVI OF OTHER PARTS OF FACE: Status: ACTIVE | Noted: 2021-05-07

## 2021-05-07 PROCEDURE — ? ADDITIONAL NOTES

## 2021-05-07 PROCEDURE — ? COUNSELING

## 2021-05-07 PROCEDURE — 99203 OFFICE O/P NEW LOW 30 MIN: CPT

## 2021-05-07 ASSESSMENT — LOCATION ZONE DERM
LOCATION ZONE: FACE
LOCATION ZONE: NOSE

## 2021-05-07 ASSESSMENT — LOCATION SIMPLE DESCRIPTION DERM
LOCATION SIMPLE: RIGHT FOREHEAD
LOCATION SIMPLE: RIGHT CHEEK
LOCATION SIMPLE: LEFT CHEEK
LOCATION SIMPLE: NOSE

## 2021-05-07 ASSESSMENT — LOCATION DETAILED DESCRIPTION DERM
LOCATION DETAILED: RIGHT INFERIOR FOREHEAD
LOCATION DETAILED: LEFT CENTRAL MALAR CHEEK
LOCATION DETAILED: RIGHT INFERIOR CENTRAL MALAR CHEEK
LOCATION DETAILED: LEFT INFERIOR CENTRAL MALAR CHEEK
LOCATION DETAILED: NASAL ROOT

## 2021-05-07 NOTE — PROCEDURE: ADDITIONAL NOTES
Additional Notes: Cosmetic pamphlet with cosmetic coordinators information provided
Render Risk Assessment In Note?: no
Detail Level: Detailed

## 2021-07-01 ENCOUNTER — TELEPHONE (OUTPATIENT)
Dept: MEDICAL GROUP | Facility: MEDICAL CENTER | Age: 31
End: 2021-07-01

## 2021-07-01 NOTE — TELEPHONE ENCOUNTER
Phone Number Called: [phone] (home)       Call outcome: Spoke to patient regarding message below.    Message: pt said she cant take time off work at this time she will check her franco and cb to make a future appt THR

## 2021-08-10 ENCOUNTER — OCCUPATIONAL MEDICINE (OUTPATIENT)
Dept: URGENT CARE | Facility: CLINIC | Age: 31
End: 2021-08-10
Payer: COMMERCIAL

## 2021-08-10 VITALS
TEMPERATURE: 98 F | HEIGHT: 67 IN | WEIGHT: 116 LBS | DIASTOLIC BLOOD PRESSURE: 88 MMHG | BODY MASS INDEX: 18.21 KG/M2 | HEART RATE: 84 BPM | RESPIRATION RATE: 16 BRPM | OXYGEN SATURATION: 100 % | SYSTOLIC BLOOD PRESSURE: 116 MMHG

## 2021-08-10 DIAGNOSIS — S05.01XA ABRASION OF RIGHT CORNEA, INITIAL ENCOUNTER: ICD-10-CM

## 2021-08-10 DIAGNOSIS — S00.211A EYELID ABRASION, RIGHT, INITIAL ENCOUNTER: ICD-10-CM

## 2021-08-10 PROCEDURE — 90715 TDAP VACCINE 7 YRS/> IM: CPT | Performed by: NURSE PRACTITIONER

## 2021-08-10 PROCEDURE — 90471 IMMUNIZATION ADMIN: CPT | Performed by: NURSE PRACTITIONER

## 2021-08-10 PROCEDURE — 99214 OFFICE O/P EST MOD 30 MIN: CPT | Mod: 25 | Performed by: NURSE PRACTITIONER

## 2021-08-10 RX ORDER — ERYTHROMYCIN 5 MG/G
1 OINTMENT OPHTHALMIC 4 TIMES DAILY
Qty: 3.5 G | Refills: 0 | Status: SHIPPED | OUTPATIENT
Start: 2021-08-10 | End: 2021-08-15

## 2021-08-10 ASSESSMENT — FIBROSIS 4 INDEX: FIB4 SCORE: 0.34

## 2021-08-10 NOTE — LETTER
[facility] [place] [address] [address]  Phone:  [phone] - Fax:  [phone]   [facility] Progress Report and Disability Certification  Date of Service: 8/10/2021   No Show:  No  Date / Time of Next Visit: 8/12/2021 5:00 PM   Claim Information   Patient Name: Sheri Carbajal  Claim Number:     Employer:   [employer] Date of Injury: 8/10/2021     Insurer / TPA: Paul[employer]  ID / SSN:     Occupation:   Diagnosis: Diagnoses of Abrasion of right cornea, initial encounter and Eyelid abrasion, right, initial encounter were pertinent to this visit.    Medical Information   Related to Industrial Injury? Yes    Subjective Complaints:  DOI: 08/10/2021. Patient states she ran in to an aluminum cami that was hang on a shelf, scratching her right eyelid and eyeball. States eyball is aching. Eye is tearing. No vision deficit. Pain 6/10. No previous history of eye injury. Does not wear contacts.      Objective Findings: Physical Exam  Eyes:      Extraocular Movements: Extraocular movements intact.      Conjunctiva/sclera:      Right eye: Right conjunctiva is injected. No chemosis, exudate or hemorrhage.     Pupils: Pupils are equal, round, and reactive to light. Pupils are equal.      Right eye: Corneal abrasion and fluorescein uptake present.      Comments: 0.25 cm superficial abrasion to right upper eyelid. No pain with EOM. Mildly injected. Visualized corneal abrasion over to lower lateral iris, approximately 3 mm, wedge shaped. No visualized foreign body.    Neurological:      Mental Status: She is alert and oriented to person, place, and time.        Pre-Existing Condition(s): None known   Assessment:   Initial Visit    Status: Additional Care Required  Permanent Disability:No    Plan: Medication    Diagnostics:      Comments:       Disability Information   Status: Released to Full Duty    From:  8/10/2021  Through: 8/12/2021 Restrictions are:      Physical Restrictions   Sitting:    Standing:    Stooping:    Bending:      Squatting:    Walking:    Climbing:    Pushing:      Pulling:    Other:    Reaching Above Shoulder (L):   Reaching Above Shoulder (R):       Reaching Below Shoulder (L):    Reaching Below Shoulder (R):      Not to exceed Weight Limits   Carrying(hrs):   Weight Limit(lb):   Lifting(hrs):   Weight  Limit(lb):     Comments: - Tdap =>8yo IM  -Erythromycin 5 MG/GM Ointment; Apply 1 Application to right eye 4 times a day for 5 days.  Dispense: 3.5 g; Refill: 0  -Visual acuity R 20/40, L 20/40, B 20/20  -Woods Lamp Exam  -NSAID's (ibuprofen 600 mg every 6 hours) and tylenol as directed for pain and inflammation.   -Eyelid and hand hygiene.  -Cleanse eyelid gently with gentle soap and warm water.    -Follow up in 2 days.    Repetitive Actions   Hands: i.e. Fine Manipulations from Grasping:     Feet: i.e. Operating Foot Controls:     Driving / Operate Machinery:     Provider Name:   ATILIO Caal Physician Signature:  Physician Name:     Clinic Name / Location: [address] [place] [id number] Clinic Phone Number: Dept: [phone]   Appointment Time: 1:15 Pm Visit Start Time: 3:00 PM   Check-In Time:  1:42 Pm Visit Discharge Time:  3:52 PM   Original-Treating Physician or Chiropractor    Page 2-Insurer/TPA    Page 3-Employer    Page 4-Employee

## 2021-08-10 NOTE — PROGRESS NOTES
"Subjective:      Sheri Carbajal is a 31 y.o. female who presents with Eye Injury (NEW WC - (R) eye scratch)            DOI: 08/10/2021. Patient states she ran in to an aluminum cami that was hang on a shelf, scratching her right eyelid and eyeball. States eyball is aching. Eye is tearing. No vision deficit. Pain 6/10. No previous history of eye injury. Does not wear contacts.     Eye Injury   The right eye is affected. Associated symptoms include eye redness. Pertinent negatives include no double vision.       Review of Systems   Eyes: Positive for pain and redness. Negative for double vision.   All other systems reviewed and are negative.         Objective:     /88 (BP Location: Left arm, Patient Position: Sitting, BP Cuff Size: Adult long)   Pulse 84   Temp 36.7 °C (98 °F) (Temporal)   Resp 16   Ht 1.702 m (5' 7\")   Wt 52.6 kg (116 lb)   SpO2 100%   Breastfeeding No   BMI 18.17 kg/m²      Physical Exam  Vitals reviewed.   Constitutional:       General: She is not in acute distress.     Appearance: She is well-developed.   HENT:      Head: Normocephalic and atraumatic.      Right Ear: External ear normal.      Left Ear: External ear normal.      Nose: Nose normal.   Eyes:      Extraocular Movements: Extraocular movements intact.      Conjunctiva/sclera:      Right eye: Right conjunctiva is injected. No chemosis, exudate or hemorrhage.     Pupils: Pupils are equal, round, and reactive to light. Pupils are equal.      Right eye: Corneal abrasion and fluorescein uptake present.      Comments: 0.25 cm superficial abrasion to right upper eyelid. No pain with EOM. Mildly injected. Visualized corneal abrasion over to lower lateral iris, approximately 3 mm, wedge shaped. No visualized foreign body.    Cardiovascular:      Rate and Rhythm: Normal rate.   Pulmonary:      Effort: Pulmonary effort is normal. No respiratory distress.   Musculoskeletal:         General: Normal range of motion.      Cervical back: " Normal range of motion.   Skin:     General: Skin is warm and dry.      Findings: No rash.   Neurological:      General: No focal deficit present.      Mental Status: She is alert and oriented to person, place, and time.      GCS: GCS eye subscore is 4. GCS verbal subscore is 5. GCS motor subscore is 6.   Psychiatric:         Mood and Affect: Mood normal.         Speech: Speech normal.         Behavior: Behavior normal.         Thought Content: Thought content normal.         Judgment: Judgment normal.                        Assessment/Plan:         1. Abrasion of right cornea, initial encounter  - Tdap =>10yo IM  - erythromycin 5 MG/GM Ointment; Apply 1 Application to right eye 4 times a day for 5 days.  Dispense: 3.5 g; Refill: 0    2. Eyelid abrasion, right, initial encounter  -Visual acuity R 20/40, L 20/40, B 20/20  -Woods Lamp Exam  -NSAID's (ibuprofen 600 mg every 6 hours) and tylenol as directed for pain and inflammation.   -Eyelid and hand hygiene.  -Cleanse eyelid gently with gentle soap and warm water.    -Follow up in 2 days.    Follow up emergently for vision changes or decreased eye movement.

## 2021-08-10 NOTE — LETTER
"EMPLOYEE’S CLAIM FOR COMPENSATION/ REPORT OF INITIAL TREATMENT  FORM C-4    EMPLOYEE’S CLAIM - PROVIDE ALL INFORMATION REQUESTED   First Name  Sheri Last Name  Solomon Birthdate                    1990                Sex  female Claim Number   Home Address  [address] Age  31 y.o. Height  1.702 m (5' 7\") Weight  52.6 kg (116 lb) [facility] Zip  [address] Telephone  [phone] (home)    Mailing Address  [address] [facility] Zip  [address] Primary Language Spoken  English    Insurer   Third Party   [employer]   Employee's Occupation (Job Title) When Injury or Occupational Disease Occurred      Employer's Name    [employer] Telephone  [phone]    Employer Address  [address]  [facility]  Zip  [address]    Date of Injury  8/10/2021               Hour of Injury  10:00 AM Date Employer Notified  8/10/2021 Last Day of Work after Injury     or Occupational Disease  8/10/2021 Supervisor to Whom Injury     Reported  Lluvia Perez   Address or Location of Accident (if applicable)  [[address]]   What were you doing at the time of accident? (if applicable)  I walked into a product sticking off shelf    How did this injury or occupational disease occur? (Be specific an answer in detail. Use additional sheet if necessary)  A product at eye level was sticking off a shelf and didn't see it I walked into it    If you believe that you have an occupational disease, when did you first have knowledge of the disability and it relationship to your employment?  NA Witnesses to the Accident  cinthia Liz      Nature of Injury or Occupational Disease  Laceration  Part(s) of Body Injured or Affected  Eye (R), ,     I certify that the above is true and correct to the best of my knowledge and that I have provided this information in " order to obtain the benefits of [place]  I hereby authorize any physician, chiropractor, surgeon, practitioner, or other person, any hospital, including [facility] or [facility] hospital, any medical service organization, any insurance company, or other institution or organization to release to each other, any medical or other information, including benefits paid or payable, pertinent to this injury or disease, except information relative to diagnosis, treatment and/or counseling for AIDS, psychological conditions, alcohol or controlled substances, for which I must give specific authorization.  A Photostat of this authorization shall be as valid as the original.     Date   Place   Employee’s Signature   THIS REPORT MUST BE COMPLETED AND MAILED WITHIN 3 WORKING DAYS OF TREATMENT   Place  [facility]   Date  8/10/2021 Diagnosis  (S05.01XA) Abrasion of right cornea, initial encounter  (S00.211A) Eyelid abrasion, right, initial encounter Is there evidence the injured employee was under the              influence of alcohol and/or another controlled substance at the time of accident?   Hour  3:00 PM Description of Injury or Disease  Diagnoses of Abrasion of right cornea, initial encounter and Eyelid abrasion, right, initial encounter were pertinent to this visit. No   Treatment  - Tdap =>8yo IM  - erythromycin 5 MG/GM Ointment; Apply 1 Application to right eye 4 times a day for 5 days.  Dispense: 3.5 g; Refill: 0  -Visual acuity  -Woods Lamp Exam  -NSAID's (ibuprofen 600 mg every 6 hours) and tylenol as directed for pain and inflammation.   Have you advised the patient to remain off work five days or     more? No   X-Ray Findings      If Yes   From Date  To Date      From information given by the employee, together with medical evidence, can you directly connect  "this injury or occupational disease as job incurred?  Yes If No Full Duty    Yes Modified Duty      Is additional medical care by a physician indicated?  Yes If Modified Duty, Specify any Limitations / Restrictions      Do you know of any previous injury or disease contributing to this condition or occupational disease?                            No   Date  8/10/2021 Print Doctor’s Name   ATILIO Caal I certify the employer’s copy of  this form was mailed on:   Address  [address] [place] [address] [address]     [facility]  [address]    Provider’s Tax ID Number  [id number] Telephone  Dept: [phone]      SAURAV Fenton  Signature:     Degree          ORIGINAL-TREATING PHYSICIAN OR CHIROPRACTOR    PAGE 2-INSURER/TPA    PAGE 3-EMPLOYER    PAGE 4-EMPLOYEE        Form C-4 (rev.10/07)           BRIEF DESCRIPTION OF RIGHTS AND BENEFITS  (Pursuant to [place] 616C.050)    Notice of Injury or Occupational Disease (Incident Report Form C-1): If an injury or occupational disease (OD) arises out of and in the course of employment, you must provide written notice to your employer as soon as practicable, but no later than 7 days after the accident or OD. Your employer shall maintain a sufficient supply of the required forms.    Claim for Compensation (Form C-4): If medical treatment is sought, the form C-4 is available at the place of initial treatment. A completed \"Claim for Compensation\" (Form C-4) must be filed within 90 days after an accident or OD. The treating physician or chiropractor must, within 3 working days after treatment, complete and mail to the employer, the employer's insurer and third-party , the Claim for Compensation.    Medical Treatment: If you require medical treatment for your on-the-job injury or OD, you may be required to select a physician or chiropractor from a list provided by your workers’ compensation insurer, if it has contracted with an Organization " for Managed Care (MCO) or Preferred Provider Organization (PPO) or providers of health care. If your employer has not entered into a contract with an MCO or PPO, you may select a physician or chiropractor from the Panel of Physicians and Chiropractors. Any medical costs related to your industrial injury or OD will be paid by your insurer.    Temporary Total Disability (TTD): If your doctor has certified that you are unable to work for a period of at least 5 consecutive days, or 5 cumulative days in a 20-day period, or places restrictions on you that your employer does not accommodate, you may be entitled to TTD compensation.    Temporary Partial Disability (TPD): If the wage you receive upon reemployment is less than the compensation for TTD to which you are entitled, the insurer may be required to pay you TPD compensation to make up the difference. TPD can only be paid for a maximum of 24 months.    Permanent Partial Disability (PPD): When your medical condition is stable and there is an indication of a PPD as a result of your injury or OD, within 30 days, your insurer must arrange for an evaluation by a rating physician or chiropractor to determine the degree of your PPD. The amount of your PPD award depends on the date of injury, the results of the PPD evaluation, your age and wage.    Permanent Total Disability (PTD): If you are medically certified by a treating physician or chiropractor as permanently and totally disabled and have been granted a PTD status by your insurer, you are entitled to receive monthly benefits not to exceed 66 2/3% of your average monthly wage. The amount of your PTD payments is subject to reduction if you previously received a lump-sum PPD award.    Vocational Rehabilitation Services: You may be eligible for vocational rehabilitation services if you are unable to return to the job due to a permanent physical impairment or permanent restrictions as a result of your injury or occupational  disease.    Transportation and Per Glendy Reimbursement: You may be eligible for travel expenses and per glendy associated with medical treatment.    Reopening: You may be able to reopen your claim if your condition worsens after claim closure.     Appeal Process: If you disagree with a written determination issued by the insurer or the insurer does not respond to your request, you may appeal to the Department of Administration, , by following the instructions contained in your determination letter. You must appeal the determination within 70 days from the date of the determination letter at [address], [address], [place], [place] [address], or [address], [address], [place], [place] [address]. If you disagree with the  decision, you may appeal to the Department of Administration, . You must file your appeal within 30 days from the date of the  decision letter at [address], [address], [place], [place] [address], or [address], [place], [place] [address]. If you disagree with a decision of an , you may file a petition for judicial review with the District Court. You must do so within 30 days of the Appeal Officer’s decision. You may be represented by an  at your own expense or you may contact the [facility] for possible representation.    [place]  for Injured Workers (NAIW): If you disagree with a  decision, you may request that NAIW represent you without charge at an  Hearing. For information regarding denial of benefits, you may contact the [facility] at: [address], [address], [place] [address], [phone]) [phone], or [address] [facility] [address], [place] [address], [phone]    To File a Complaint with the Division: If you wish to file a complaint with the  of the Division of Industrial Relations (DIR),  please contact the  Workers’ Compensation Section, [address], [address], [place], [place] [address], telephone [phone]) [phone], or [address], [address], [place], [place] [address], telephone [phone].    For assistance with Workers’ Compensation Issues: You may contact the [facility] Office for Consumer Health Assistance, [address], [address], [place], [place] [address], Toll Free 1-183.493.8070, Web site: http://[facility].[place].gov/Programs/FORTINO E-mail: fortino@[facility].nv.gov              __________________________________________________________________                                    _________________            Employee Name / Signature                                                                                                                            Date                                                                                                                                                                                                                              D-2 (rev. 10/20)

## 2021-08-10 NOTE — PATIENT INSTRUCTIONS
Corneal Abrasion    A corneal abrasion is a scratch or injury to the clear covering over the front of your eye (cornea). Your cornea forms a clear dome that protects your eye and helps to focus your vision. Your cornea is made up of many layers. The surface layer is a single layer of cells (corneal epithelium). It is one of the most sensitive tissues in your body. A corneal abrasion can be very painful.  If a corneal abrasion is not treated, it can become infected and cause an ulcer. This can lead to scarring. A scarred cornea can affect your vision. Sometimes abrasions come back in the same area, even after the original injury has healed (recurrent erosion syndrome).  What are the causes?  This condition may be caused by:  · A poke in the eye.  · A gritty or irritating substance (foreign body) in the eye.  · Excessive eye rubbing.  · Very dry eyes.  · Certain eye infections.  · Contact lenses that fit poorly or are worn for a long period of time. You can also injure your cornea when putting contacts lenses in your eye or taking them out.  · Eye surgery.  Sometimes, the cause is unknown.  What are the signs or symptoms?  Symptoms of this condition include:  · Eye pain. The pain may get worse when your eye is open or when you move your eye.  · A feeling of something stuck in your eye.  · Having trouble keeping your eye open, or not being able to keep it open.  · Tearing and redness.  · Sensitivity to light.  · Blurred vision.  · Headache.  How is this diagnosed?  This condition may be diagnosed based on:  · Your medical history.  · Your symptoms.  · An eye exam. You may work with a health care provider who specializes in diseases and conditions of the eye (ophthalmologist). Before the eye exam, numbing drops may be put into your eye. You may also have dye put in your eye with a dropper or a small paper strip. The dye makes the abrasion easy to see when your ophthalmologist examines your eye with a light. Your  ophthalmologist may look at your eye through an eye scope (slit lamp).  How is this treated?  Treatment may vary depending on the cause of your condition, and it may include:  · Washing out your eye.  · Removing any foreign body.  · Antibiotic drops or ointment to treat an infection.  · Steroid drops or ointment to treat redness, irritation, or inflammation.  · Pain medicine.  · An eye patch to keep your eye closed.  Follow these instructions at home:  Medicines  · Use eye drops or ointments as told by your eye care provider.  · If you were prescribed antibiotic drops or ointment, use them as told by your eye care provider. Do not stop using the antibiotic even if you start to feel better.  · Take over-the-counter and prescription medicines only as told by your eye care provider.  · Do not drive or use heavy machinery while taking prescription pain medicine.  General instructions  · If you have an eye patch, wear it as told by your eye care provider.  ? Do not drive or use machinery while wearing an eye patch. Your ability to  distances will be impaired.  ? Follow instructions from your eye care provider about when to remove the patch.  · Ask your eye care provider whether you can use a cold, wet cloth (compress) on your eye to relieve pain.  · Do not rub or touch your eye. Do not wash out your eye.  · Do not wear contact lenses until your eye care provider says that this is okay.  · Avoid bright light and eye strain.  · Keep all follow-up visits as told by your eye care provider. This is important for preventing infection and vision loss.  Contact a health care provider if:  · You continue to have eye pain and other symptoms for more than 2 days.  · You develop new symptoms, such as redness, tearing, or discharge.  · You have discharge that makes your eyelids stick together in the morning.  · Your eye patch becomes so loose that you can blink your eye.  · Symptoms return after the original abrasion has  healed.  Get help right away if:  · You have severe eye pain that does not get better with medicine.  · You have vision loss.  Summary  · A corneal abrasion is a scratch on the outer layer of the clear covering over the front of your eye (cornea).  · Corneal abrasion can cause eye pain, redness, tearing, and blurred vision.  · This condition is usually treated with medicine to prevent infection and scarring. You also may have to wear an eye patch to cover your eye.  · Let your eye care provider know if your symptoms continue for more than 2 days.  This information is not intended to replace advice given to you by your health care provider. Make sure you discuss any questions you have with your health care provider.  Document Released: 12/15/2001 Document Revised: 11/28/2017 Document Reviewed: 11/28/2017  [employer] Interactive Patient Education © 2020 [employer] Inc[employer]

## 2021-08-12 ENCOUNTER — OCCUPATIONAL MEDICINE (OUTPATIENT)
Dept: URGENT CARE | Facility: CLINIC | Age: 31
End: 2021-08-12
Payer: COMMERCIAL

## 2021-08-12 VITALS
SYSTOLIC BLOOD PRESSURE: 120 MMHG | HEART RATE: 95 BPM | BODY MASS INDEX: 17.92 KG/M2 | WEIGHT: 114.2 LBS | HEIGHT: 67 IN | TEMPERATURE: 97.7 F | OXYGEN SATURATION: 97 % | RESPIRATION RATE: 16 BRPM | DIASTOLIC BLOOD PRESSURE: 88 MMHG

## 2021-08-12 DIAGNOSIS — S05.01XD ABRASION OF RIGHT CORNEA, SUBSEQUENT ENCOUNTER: ICD-10-CM

## 2021-08-12 PROCEDURE — 99213 OFFICE O/P EST LOW 20 MIN: CPT | Performed by: PHYSICIAN ASSISTANT

## 2021-08-12 ASSESSMENT — ENCOUNTER SYMPTOMS
EYE PAIN: 1
DOUBLE VISION: 0
EYE REDNESS: 1

## 2021-08-12 ASSESSMENT — FIBROSIS 4 INDEX: FIB4 SCORE: 0.34

## 2021-08-12 NOTE — LETTER
[address] [place] [address] [place] [address]  Phone:  [phone] - Fax:  [phone]   [facility] Progress Report and Disability Certification  Date of Service: 8/12/2021   No Show:  No  Date / Time of Next Visit:     Claim Information   Patient Name: Sheri Carbajal  Claim Number:     Employer:   [employer] Date of Injury: 8/10/2021     Insurer / TPA: [employer]  ID / SSN:     Occupation:   Diagnosis: The encounter diagnosis was Abrasion of right cornea, subsequent encounter.    Medical Information   Related to Industrial Injury? Yes    Subjective Complaints:  DOI: 8/10/2021.  Right corneal abrasion.  No further pain or concerns today.  Denies redness or discharge.  No vision changes.  Using ointment as directed.   Objective Findings: PERRLA, EOMs intact.  Conjunctiva shows no injection, erythema or discharge.  No foreign body seen.  Vision grossly intact.  Exam normal.   Pre-Existing Condition(s):     Assessment:   Condition Improved    Status: Discharged /  MMI  Permanent Disability:No    Plan:      Diagnostics:      Comments:       Disability Information   Status: Released to Full Duty    From:     Through:   Restrictions are:     Physical Restrictions   Sitting:    Standing:    Stooping:    Bending:      Squatting:    Walking:    Climbing:    Pushing:      Pulling:    Other:    Reaching Above Shoulder (L):   Reaching Above Shoulder (R):       Reaching Below Shoulder (L):    Reaching Below Shoulder (R):      Not to exceed Weight Limits   Carrying(hrs):   Weight Limit(lb):   Lifting(hrs):   Weight  Limit(lb):     Comments:      Repetitive Actions   Hands: i.e. Fine Manipulations from Grasping:     Feet: i.e. Operating Foot Controls:     Driving / Operate Machinery:     Provider Name:   Rajesh Infante P.A.-C. Physician Signature:  Physician Name:     Clinic Name / Location: [address]  [address] Clinic Phone Number: Dept: [phone]   Appointment Time: 5:00 Pm Visit Start Time: 5:02 PM   Check-In Time:  4:56 Pm Visit Discharge Time:  5:33 PM    Original-Treating Physician or Chiropractor    Page 2-Insurer/TPA    Page 3-Employer    Page 4-Employee

## 2021-08-13 NOTE — PROGRESS NOTES
"Subjective     Sheri Carbajal is a 31 y.o. female who presents with Follow-Up (eye (R) injury getting better no pain)      DOI: 8/10/2021.  Right corneal abrasion.  No further pain or concerns today.  Denies redness or discharge.  No vision changes.  Using ointment as directed.     HPI    ROS           Objective     /88 (BP Location: Left arm, Patient Position: Sitting, BP Cuff Size: Adult)   Pulse 95   Temp 36.5 °C (97.7 °F) (Temporal)   Resp 16   Ht 1.702 m (5' 7\")   Wt 51.8 kg (114 lb 3.2 oz)   SpO2 97%   BMI 17.89 kg/m²      Physical Exam    PERRLA, EOMs intact.  Conjunctiva shows no injection, erythema or discharge.  No foreign body seen.  Vision grossly intact.  Exam normal.                   Assessment & Plan         1. Abrasion of right cornea, subsequent encounter       Symptoms resolved.  No further questions or concerns.  Exam normal    Please note that this dictation was created using voice recognition software. I have made every reasonable attempt to correct obvious errors, but I expect that there are errors of grammar and possibly content that I did not discover before finalizing the note.    "

## 2021-11-08 ENCOUNTER — OFFICE VISIT (OUTPATIENT)
Dept: MEDICAL GROUP | Facility: MEDICAL CENTER | Age: 31
End: 2021-11-08
Payer: COMMERCIAL

## 2021-11-08 VITALS
OXYGEN SATURATION: 98 % | TEMPERATURE: 98.3 F | WEIGHT: 114.86 LBS | HEIGHT: 67 IN | HEART RATE: 75 BPM | DIASTOLIC BLOOD PRESSURE: 84 MMHG | BODY MASS INDEX: 18.03 KG/M2 | RESPIRATION RATE: 18 BRPM | SYSTOLIC BLOOD PRESSURE: 122 MMHG

## 2021-11-08 DIAGNOSIS — F33.1 MODERATE EPISODE OF RECURRENT MAJOR DEPRESSIVE DISORDER (HCC): ICD-10-CM

## 2021-11-08 DIAGNOSIS — R51.9 DAILY HEADACHE: ICD-10-CM

## 2021-11-08 PROCEDURE — 99213 OFFICE O/P EST LOW 20 MIN: CPT | Performed by: NURSE PRACTITIONER

## 2021-11-08 RX ORDER — KETOROLAC TROMETHAMINE 30 MG/ML
60 INJECTION, SOLUTION INTRAMUSCULAR; INTRAVENOUS ONCE
Status: COMPLETED | OUTPATIENT
Start: 2021-11-08 | End: 2021-11-08

## 2021-11-08 RX ADMIN — KETOROLAC TROMETHAMINE 60 MG: 30 INJECTION, SOLUTION INTRAMUSCULAR; INTRAVENOUS at 09:03

## 2021-11-08 ASSESSMENT — FIBROSIS 4 INDEX: FIB4 SCORE: 0.34

## 2021-11-08 NOTE — ASSESSMENT & PLAN NOTE
Previously followed by psychiatry, had tried several medications including sertraline, Wellbutrin.  Had various side effects and is now on no treatment.

## 2021-11-08 NOTE — PROGRESS NOTES
"Subjective:     Chief Complaint   Patient presents with   • Headache     x1week      Sheri Carbajal is a 31 y.o. female here today to follow up on:    Daily headache  This is a primary concern today.  She reports daily headache over the last week, 6 out of 10, bilateral in the temporal region.  Some associated nausea, feels that its causing her difficulty with sleep as well.  She does have history of cervicogenic headaches.  At this time she denies any neck pain  No history of migraines  She has had more stress recently related to work issues.  Her anxiety and depression continue to be uncontrolled  Denies any associated vision change, dizziness, congestion, sinus pressure, fever, chills, sore throat.  She has had some slight nausea.  No vomiting.  Reports that she has had a vision exam and does not require correction  She is using Excedrin with short-term relief  No dietary triggers that she has identified.  She is not getting much sleep, about 4 hours last night.  Feels that she is taking adequate fluids, not skipping meals      Moderate episode of recurrent major depressive disorder (HCC)  Previously followed by psychiatry, had tried several medications including sertraline, Wellbutrin.  Had various side effects and is now on no treatment.       Current medicines (including changes today)  No current outpatient medications on file.     Current Facility-Administered Medications   Medication Dose Route Frequency Provider Last Rate Last Admin   • ketorolac (TORADOL) injection 60 mg  60 mg Intramuscular Once DIXON Polanco.         She  has no past medical history on file.    ROS included above     Objective:     /84 (BP Location: Left arm, Patient Position: Sitting, BP Cuff Size: Adult)   Pulse 75   Temp 36.8 °C (98.3 °F) (Temporal)   Resp 18   Ht 1.702 m (5' 7\")   Wt 52.1 kg (114 lb 13.8 oz)   SpO2 98%  Body mass index is 17.99 kg/m².     Physical Exam:  General: Alert, oriented in no acute " distress.  Eye contact is good, speech is normal, affect calm  HEENT: No point tenderness over the scalp.  PERRLA, TMs gray and reflective bilaterally.  No cervical or tonsillar lymphadenopathy.  EOMI  Lungs: clear to auscultation bilaterally, normal effort, no wheeze/ rhonchi/ rales.  CV: regular rate and rhythm, S1, S2, no murmur  Ext: no edema, color normal, vascularity normal, temperature normal    Assessment and Plan:   The following treatment plan was discussed   1. Daily headache   tension type headache daily for the last week.  Multifactorial nature of headaches reviewed.  She has not had any vision problems, no red flags, no recent illness.  I will give Toradol injection in clinic today, natural treatment options for headache reviewed.  She will follow-up if persisting  ketorolac (TORADOL) injection 60 mg  Natural treatment options for frequent headache or migraine:    1. Start magnesium oxide 400mg by mouth every night, may take extra dose if needed for headache (over the counter), hold for diarrhea  2. Start Riboflavin (Vitamin B2) 400mg by mouth every night (over the counter),may turn urine bright yellow  3. Start COQ 10, take 300mg every night. (over the counter)  4. Attempt to go to bed and get up at the same time every night  5. Eat meals on regular basis  6. Stay hydrated  7. Exercise 30 minutes daily  8. Avoid aged or smoked foods, avoid processed foods, red wine, aged cheese  9. Keep headache diary, include foods that you may have eaten.  10. Avoid overusing over the counter medications:  Do not take more than 500mg acetaminophen (tylenol), more than 4 times weekly, more frequent or larger doses are associated with medication overuse headache.                                  2. Moderate episode of recurrent major depressive disorder (HCC)   no current treatment.  She perceives various side effects with multiple medications.  Encouraged follow-up with psychiatry       Followup: 1 week if no  improvement, sooner as needed         Please note that this dictation was created using voice recognition software. I have worked with consultants from the vendor as well as technical experts from [facility] to optimize the interface. I have made every reasonable attempt to correct obvious errors, but I expect that there are errors of grammar and possibly content that I did not discover before finalizing the note.

## 2021-11-08 NOTE — ASSESSMENT & PLAN NOTE
This is a primary concern today.  She reports daily headache over the last week, 6 out of 10, bilateral in the temporal region.  Some associated nausea, feels that its causing her difficulty with sleep as well.  She does have history of cervicogenic headaches.  At this time she denies any neck pain  No history of migraines  She has had more stress recently related to work issues.  Her anxiety and depression continue to be uncontrolled  Denies any associated vision change, dizziness, congestion, sinus pressure, fever, chills, sore throat.  She has had some slight nausea.  No vomiting.  Reports that she has had a vision exam and does not require correction  She is using Excedrin with short-term relief  No dietary triggers that she has identified.  She is not getting much sleep, about 4 hours last night.  Feels that she is taking adequate fluids, not skipping meals

## 2022-02-10 NOTE — PROGRESS NOTES
Subjective:     CC:  Diagnoses of Right hand pain, Tobacco use, Bipolar affective disorder, currently depressed, mild (HCC), Moderate episode of recurrent major depressive disorder (HCC), and Generalized anxiety disorder were pertinent to this visit.    HISTORY OF THE PRESENT ILLNESS: Patient is a 31 y.o. female. This pleasant patient is here today to establish care and discuss chronic conditions. Her prior PCP was HOWARD Oquendo.    Problem   Right Hand Pain    Acute condition for about 2 months.    She describes intermittent aching pain and numbness to right hand closer to thumb side, worse with using right hand and better with flicking. She has been taking ibuprofen and taping right hand with minimal relief. She reports this has been debilitating for her daily activities. 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 She does not wish to pursue further testing at this time and would like to trial a wrist injection today.     Bipolar Affective Disorder, Currently Depressed, Mild (Hcc)    Chronic condition. Followed and managed by psychiatrist Hipolito Nick.  Current regimen: Abilify 10mg daily, Lamictal 25mg daily  She reports compliance and/or tolerance and symptoms controlled with current medication(s). Does not need medication(s) refill. No acute concerns.     Tobacco Use    Chronic condition. She has been smoking 1 pack per week on and off x 15 years. Has tried nicotine products in the past which did not help. She is not ready to quit at this time.     Moderate Episode of Recurrent Major Depressive Disorder (Hcc)    Chronic condition. Followed and managed by psychiatrist Hipolito Nick and sees therapist every 2 weeks.  Current regimen: bupropion XL 150mg daily  She reports compliance and/or tolerance and symptoms controlled with current medication(s). Does not need medication(s) refill. No acute concerns.     Generalized Anxiety Disorder    Chronic condition. Followed and  "managed by psychiatrist Hipolito Nick and sees therapist every 2 weeks.  Current regimen: bupropion XL 150mg daily  She reports compliance and/or tolerance and symptoms controlled with current medication(s). Does not need medication(s) refill. No acute concerns.         Current Outpatient Medications Ordered in Epic   Medication Sig Dispense Refill   • ARIPiprazole (ABILIFY) 10 MG Tab QT BY MOUTH EVERY DAY IN THE MORNING FOR 4 WEEKS     • buPROPion (WELLBUTRIN XL) 150 MG XL tablet TAKE 1 TABLET BY MOUTH EVERY DAY IN THE MORNING FOR 4 WEEKS     • lamoTRIgine (LAMICTAL) 25 MG Tab TAKE 1 TABLET BY MOUTH EVERY DAY AT BEDTIME FOR 3 WEEKS, THEN INCREASE TO 2 PILLS AT BEDTIME       No current Epic-ordered facility-administered medications on file.     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  Alcohol/tobacco/illicit drugs: smokes 1 pack per week on and off x 15 years, social EtOH, denies illicit drugs  Diet/Exercise: tries to eat healthy in general, exercise with running regularly    Health Maintenance: reviewed and discussed with patient  Vaccines: flu received 10/2021, Tdap received 08/2021, PPSV23 received 06/2019, Pfizer COVID #3 received 12/2021  Pap smear + hrHPV 03/2019 negative    ROS:   Gen: no fevers/chills  Pulm: no sob, no cough  CV: no chest pain, no palpitations  GI: no nausea/vomiting, no diarrhea  MSk: + right hand pain  Skin: no rash  Neuro: no headaches, + right hand numbness/tingling      Objective:     Exam: /82 (BP Location: Left arm, Patient Position: Sitting, BP Cuff Size: Adult)   Pulse 76   Temp 37.2 °C (98.9 °F) (Temporal)   Resp 14   Ht 1.702 m (5' 7\")   Wt 54.2 kg (119 lb 7.8 oz)   SpO2 98%  Body mass index is 18.71 kg/m².    General: Normal appearing. No distress.  HEENT: Normocephalic. Eyes conjunctiva clear lids without ptosis, ears normal shape and contour, canals are clear " bilaterally, tympanic membranes are benign, nasal mucosa benign, oropharynx is without erythema, edema or exudates.   Neck: Supple.  Pulmonary: Clear to ausculation. Normal effort. No rales, ronchi, or wheezing.  Cardiovascular: Regular rate and rhythm without murmur.  Neurologic: Grossly nonfocal  Skin: Warm and dry. No obvious lesions.  Musculoskeletal: Normal gait. No extremity cyanosis, clubbing, or edema. Right hand exam: + Tinel, + Phalen  Psych: Normal mood and affect. Alert and oriented x3. Judgment and insight is normal.    Labs:   Lab Results   Component Value Date/Time    WBC 6.4 11/11/2020 07:06 AM    RBC 5.13 11/11/2020 07:06 AM    HEMOGLOBIN 16.0 11/11/2020 07:06 AM    HEMATOCRIT 47.4 (H) 11/11/2020 07:06 AM    MCV 92.4 11/11/2020 07:06 AM    MCH 31.2 11/11/2020 07:06 AM    MCHC 33.8 11/11/2020 07:06 AM    RDW 41.5 11/11/2020 07:06 AM    PLATELETCT 278 11/11/2020 07:06 AM    MPV 11.0 11/11/2020 07:06 AM      Lab Results   Component Value Date/Time    SODIUM 134 (L) 11/11/2020 07:06 AM    POTASSIUM 4.1 11/11/2020 07:06 AM    CHLORIDE 100 11/11/2020 07:06 AM    CO2 24 11/11/2020 07:06 AM    ANION 10.0 11/11/2020 07:06 AM    GLUCOSE 74 11/11/2020 07:06 AM    BUN 14 11/11/2020 07:06 AM    CREATININE 0.72 11/11/2020 07:06 AM    CALCIUM 9.3 11/11/2020 07:06 AM    ASTSGOT 13 11/11/2020 07:06 AM    ALTSGPT 16 11/11/2020 07:06 AM    TBILIRUBIN 0.6 11/11/2020 07:06 AM    ALBUMIN 4.7 11/11/2020 07:06 AM    TOTPROTEIN 7.1 11/11/2020 07:06 AM    GLOBULIN 2.4 11/11/2020 07:06 AM    AGRATIO 2.0 11/11/2020 07:06 AM     Lab Results   Component Value Date/Time    HBA1C 5.3 11/11/2020 07:06 AM      Lab Results   Component Value Date/Time    TSHULTRASEN 1.470 11/11/2020 0706     PRE-OP DIAGNOSIS: right carpel tunnel syndrome  POST-OP DIAGNOSIS: Same   PROCEDURE: right carpal tunnel steroid injection     Dose:        _X  1%        _  2%   Lidocaine      _2  mL    _X  Steroid 40mg/mL Triamcinolone  acetonide     Procedure: Patient consent to procedure obtained verbally. The area was prepped in the usual sterile manner. The injection is performed at a site just ulnar to the palmaris longus tendon and at the proximal wrist crease. The needle is inserted at a 30-degree angle and directed toward the ring finger. Aspiration was done to ensure that the needle has not been placed in a blood vessel. Medication was injected slowly. There were no complications during this procedure.     Followup: The patient tolerated the procedure well without complications.  Standard post-procedure care is explained and return  precautions are given.    Assessment & Plan:   31 y.o. female with the following -    1. Right hand pain  Subacute condition, persistent. Clinically consistent with carpal tunnel syndrome but possibly overuse injury as well. Patient declined further diagnostic testing and requested trial of wrist injection.  - right carpel tunnel steroid injection performed today, patient tolerated well without complications  - advised to avoid exacerbating activities and trial nocturnal wrist brace  - return every 3-4 months for injection as needed  - lidocaine (XYLOCAINE) 1 % injection 2 mL  - triamcinolone acetonide (KENALOG-40) injection 40 mg    2. Tobacco use  Chronic condition, persistent. Patient states not ready to quit at this time. Also taking Wellbutrin for concurrent psychiatric condition.  - education and brief counseling on tobacco cessation provided (time spent 5 minutes), pt was encouraged to quit smoking, cessation methods/medications were recommended and/or discussed     3. Bipolar affective disorder, currently depressed, mild (HCC)  Chronic condition, stable. Followed and managed by psychiatrist Hipolito Nick. Patient declined blood tests for monitoring at this time.  - cont management per psychiatry    4. Moderate episode of recurrent major depressive disorder (HCC)  Chronic condition, stable. Followed and  managed by psychiatrist Hipolito Nick. Patient declined blood tests for monitoring at this time.  - cont management per psychiatry    5. Generalized anxiety disorder  Chronic condition, stable. Followed and managed by psychiatrist Hipolito Nick. Patient declined blood tests for monitoring at this time.  - cont management per psychiatry    Return if symptoms worsen or fail to improve.    Please note that this dictation was created using voice recognition software. I have made every reasonable attempt to correct obvious errors, but I expect that there are errors of grammar and possibly content that I did not discover before finalizing the note.

## 2022-02-17 ENCOUNTER — OFFICE VISIT (OUTPATIENT)
Dept: MEDICAL GROUP | Facility: MEDICAL CENTER | Age: 32
End: 2022-02-17
Payer: COMMERCIAL

## 2022-02-17 VITALS
HEIGHT: 67 IN | OXYGEN SATURATION: 98 % | HEART RATE: 76 BPM | DIASTOLIC BLOOD PRESSURE: 82 MMHG | TEMPERATURE: 98.9 F | BODY MASS INDEX: 18.75 KG/M2 | SYSTOLIC BLOOD PRESSURE: 104 MMHG | WEIGHT: 119.49 LBS | RESPIRATION RATE: 14 BRPM

## 2022-02-17 DIAGNOSIS — M79.641 RIGHT HAND PAIN: ICD-10-CM

## 2022-02-17 DIAGNOSIS — F41.1 GENERALIZED ANXIETY DISORDER: ICD-10-CM

## 2022-02-17 DIAGNOSIS — Z72.0 TOBACCO USE: ICD-10-CM

## 2022-02-17 DIAGNOSIS — F33.1 MODERATE EPISODE OF RECURRENT MAJOR DEPRESSIVE DISORDER (HCC): ICD-10-CM

## 2022-02-17 DIAGNOSIS — F31.31 BIPOLAR AFFECTIVE DISORDER, CURRENTLY DEPRESSED, MILD (HCC): ICD-10-CM

## 2022-02-17 PROCEDURE — 99213 OFFICE O/P EST LOW 20 MIN: CPT | Mod: 25 | Performed by: STUDENT IN AN ORGANIZED HEALTH CARE EDUCATION/TRAINING PROGRAM

## 2022-02-17 PROCEDURE — 20526 THER INJECTION CARP TUNNEL: CPT | Mod: RT | Performed by: STUDENT IN AN ORGANIZED HEALTH CARE EDUCATION/TRAINING PROGRAM

## 2022-02-17 RX ORDER — TRIAMCINOLONE ACETONIDE 40 MG/ML
40 INJECTION, SUSPENSION INTRA-ARTICULAR; INTRAMUSCULAR ONCE
Status: COMPLETED | OUTPATIENT
Start: 2022-02-17 | End: 2022-02-17

## 2022-02-17 RX ORDER — ARIPIPRAZOLE 10 MG/1
TABLET ORAL
COMMUNITY
Start: 2022-02-07 | End: 2022-06-07

## 2022-02-17 RX ORDER — BUPROPION HYDROCHLORIDE 150 MG/1
TABLET ORAL
COMMUNITY
Start: 2022-02-07 | End: 2022-06-07

## 2022-02-17 RX ORDER — LAMOTRIGINE 25 MG/1
TABLET ORAL
COMMUNITY
Start: 2022-02-07 | End: 2022-06-07

## 2022-02-17 RX ADMIN — Medication 2 ML: at 16:23

## 2022-02-17 RX ADMIN — TRIAMCINOLONE ACETONIDE 40 MG: 40 INJECTION, SUSPENSION INTRA-ARTICULAR; INTRAMUSCULAR at 16:24

## 2022-02-17 ASSESSMENT — FIBROSIS 4 INDEX: FIB4 SCORE: 0.34

## 2022-06-07 ENCOUNTER — OFFICE VISIT (OUTPATIENT)
Dept: MEDICAL GROUP | Facility: MEDICAL CENTER | Age: 32
End: 2022-06-07
Payer: COMMERCIAL

## 2022-06-07 VITALS
HEART RATE: 76 BPM | DIASTOLIC BLOOD PRESSURE: 72 MMHG | RESPIRATION RATE: 16 BRPM | WEIGHT: 121.69 LBS | OXYGEN SATURATION: 98 % | HEIGHT: 67 IN | TEMPERATURE: 98.8 F | SYSTOLIC BLOOD PRESSURE: 104 MMHG | BODY MASS INDEX: 19.1 KG/M2

## 2022-06-07 DIAGNOSIS — Z97.5 IUD (INTRAUTERINE DEVICE) IN PLACE: ICD-10-CM

## 2022-06-07 DIAGNOSIS — N94.6 MENSTRUAL CRAMPS: ICD-10-CM

## 2022-06-07 PROCEDURE — 99213 OFFICE O/P EST LOW 20 MIN: CPT | Performed by: STUDENT IN AN ORGANIZED HEALTH CARE EDUCATION/TRAINING PROGRAM

## 2022-06-07 RX ORDER — VORTIOXETINE 10 MG/1
TABLET, FILM COATED ORAL
COMMUNITY
Start: 2022-05-24

## 2022-06-07 RX ORDER — LAMOTRIGINE 150 MG/1
TABLET ORAL
COMMUNITY
Start: 2022-05-12 | End: 2022-06-07

## 2022-06-07 RX ORDER — HYDROXYZINE PAMOATE 25 MG/1
CAPSULE ORAL
COMMUNITY
Start: 2022-04-18 | End: 2022-06-07

## 2022-06-07 ASSESSMENT — FIBROSIS 4 INDEX: FIB4 SCORE: 0.35

## 2022-06-07 NOTE — PROGRESS NOTES
"Subjective:     CC: menstrual cramps    HPI:   Sheri presents today for menstrual cramps    Problem   IUD (Intrauterine Device) in Place    Chronic condition. She had copper IUD placed 08/2021 at [facility]. She has been having menstrual cramping throughout her menstrual periods which has been regular every 30 days or so. She has been taking ibuprofen 600mg as needed and warm compress. The pain has been distressing for her and she would like to consider surgical procedure as she is sure that she does not plan on having children in the future.    She reports she does not do well with any hormonal birth control methods. She tried Mirena in the past with bleeding problems. She tried OCPs in the past and had issues with forgetting to take them.     Menstrual Cramps    Chronic condition. She has been having menstrual cramping throughout her menstrual periods which has been regular every 30 days or so. She has been taking ibuprofen 600mg as needed and warm compress. The pain has been distressing for her and she would like to consider surgical procedure as she is sure that she does not plan on having children in the future.    She reports she does not do well with any hormonal birth control methods. She tried Mirena in the past with bleeding problems. She tried OCPs in the past and had issues with forgetting to take them.         Current Outpatient Medications Ordered in Epic   Medication Sig Dispense Refill   • TRINTELLIX 10 MG Tab TAKE 1 TABLET BY MOUTH IN THE MORNING FOR 4 WEEKS       No current Epic-ordered facility-administered medications on file.     ROS:  Gen: no fevers/chills  Pulm: no sob, no cough  CV: no chest pain, no palpitations  GI: no nausea/vomiting, no diarrhea  : no dysuria  Neuro: no headaches    Objective:     Exam:  /72 (BP Location: Left arm, Patient Position: Sitting, BP Cuff Size: Adult)   Pulse 76   Temp 37.1 °C (98.8 °F) (Temporal)   Resp 16   Ht 1.702 m (5' 7\")   Wt 55.2 " kg (121 lb 11.1 oz)   SpO2 98%   BMI 19.06 kg/m²  Body mass index is 19.06 kg/m².    Gen: Alert and oriented, No apparent distress.  Lungs: Normal effort, CTA bilaterally, no wheezes, rhonchi, or rales  CV: Regular rate and rhythm. No murmurs, rubs, or gallops.  Ext: No clubbing, cyanosis, edema.    Labs: no new lab results since last visit    Assessment & Plan:     32 y.o. female with the following -     1. Menstrual cramps  Chronic condition, persistent. Likely related to her copper IUD. We discussed other birth control options. She is interested in tubal ligation as she is sure that she does not plan on having children in the future. Plan for referral to OBGYN for IUD removal and consideration of tubal ligation. Advised to trial ibuprofen 800mg as needed for menstrual cramps for now.  - Referral to OB/Gyn    2. IUD (intrauterine device) in place  - plan as above  - handout of various birth control methods provided to patient  - Referral to OB/Gyn    Return if symptoms worsen or fail to improve.    Please note that this dictation was created using voice recognition software. I have made every reasonable attempt to correct obvious errors, but I expect that there are errors of grammar and possibly content that I did not discover before finalizing the note.

## 2022-11-21 ENCOUNTER — HOSPITAL ENCOUNTER (OUTPATIENT)
Facility: MEDICAL CENTER | Age: 32
End: 2022-11-21
Payer: COMMERCIAL

## 2022-11-21 ENCOUNTER — HOSPITAL ENCOUNTER (OUTPATIENT)
Dept: LAB | Facility: MEDICAL CENTER | Age: 32
End: 2022-11-21

## 2022-11-21 PROCEDURE — 88175 CYTOPATH C/V AUTO FLUID REDO: CPT

## 2022-11-21 PROCEDURE — 87624 HPV HI-RISK TYP POOLED RSLT: CPT

## 2022-11-22 LAB
CYTOLOGY REG CYTOL: ABNORMAL
HPV HR 12 DNA CVX QL NAA+PROBE: POSITIVE
HPV16 DNA SPEC QL NAA+PROBE: NEGATIVE
HPV18 DNA SPEC QL NAA+PROBE: NEGATIVE
SPECIMEN SOURCE: ABNORMAL

## 2023-01-17 ENCOUNTER — HOSPITAL ENCOUNTER (EMERGENCY)
Facility: MEDICAL CENTER | Age: 33
End: 2023-01-17
Attending: EMERGENCY MEDICINE
Payer: COMMERCIAL

## 2023-01-17 ENCOUNTER — APPOINTMENT (OUTPATIENT)
Dept: RADIOLOGY | Facility: MEDICAL CENTER | Age: 33
End: 2023-01-17
Attending: EMERGENCY MEDICINE
Payer: COMMERCIAL

## 2023-01-17 VITALS
BODY MASS INDEX: 18.83 KG/M2 | OXYGEN SATURATION: 99 % | RESPIRATION RATE: 18 BRPM | SYSTOLIC BLOOD PRESSURE: 131 MMHG | HEIGHT: 67 IN | HEART RATE: 80 BPM | DIASTOLIC BLOOD PRESSURE: 83 MMHG | WEIGHT: 120 LBS | TEMPERATURE: 97.9 F
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
## 2023-01-17 DIAGNOSIS — S32.009A CLOSED FRACTURE OF SPINOUS PROCESS OF LUMBAR VERTEBRA, INITIAL ENCOUNTER (HCC): ICD-10-CM

## 2023-01-17 PROCEDURE — 96376 TX/PRO/DX INJ SAME DRUG ADON: CPT | Mod: EDC

## 2023-01-17 PROCEDURE — 72131 CT LUMBAR SPINE W/O DYE: CPT

## 2023-01-17 PROCEDURE — 96375 TX/PRO/DX INJ NEW DRUG ADDON: CPT | Mod: EDC

## 2023-01-17 PROCEDURE — 700101 HCHG RX REV CODE 250: Performed by: EMERGENCY MEDICINE

## 2023-01-17 PROCEDURE — 700102 HCHG RX REV CODE 250 W/ 637 OVERRIDE(OP): Performed by: EMERGENCY MEDICINE

## 2023-01-17 PROCEDURE — 96374 THER/PROPH/DIAG INJ IV PUSH: CPT | Mod: EDC

## 2023-01-17 PROCEDURE — 72192 CT PELVIS W/O DYE: CPT

## 2023-01-17 PROCEDURE — A9270 NON-COVERED ITEM OR SERVICE: HCPCS | Performed by: EMERGENCY MEDICINE

## 2023-01-17 PROCEDURE — 99285 EMERGENCY DEPT VISIT HI MDM: CPT | Mod: EDC

## 2023-01-17 PROCEDURE — 700111 HCHG RX REV CODE 636 W/ 250 OVERRIDE (IP): Performed by: EMERGENCY MEDICINE

## 2023-01-17 PROCEDURE — 72170 X-RAY EXAM OF PELVIS: CPT

## 2023-01-17 PROCEDURE — 700105 HCHG RX REV CODE 258: Performed by: EMERGENCY MEDICINE

## 2023-01-17 RX ORDER — KETOROLAC TROMETHAMINE 30 MG/ML
30 INJECTION, SOLUTION INTRAMUSCULAR; INTRAVENOUS ONCE
Status: COMPLETED | OUTPATIENT
Start: 2023-01-17 | End: 2023-01-17

## 2023-01-17 RX ORDER — LIDOCAINE 50 MG/G
1 PATCH TOPICAL ONCE
Status: DISCONTINUED | OUTPATIENT
Start: 2023-01-17 | End: 2023-01-17 | Stop reason: HOSPADM

## 2023-01-17 RX ORDER — OXYCODONE HYDROCHLORIDE AND ACETAMINOPHEN 5; 325 MG/1; MG/1
1 TABLET ORAL ONCE
Status: COMPLETED | OUTPATIENT
Start: 2023-01-17 | End: 2023-01-17

## 2023-01-17 RX ORDER — DIAZEPAM 5 MG/ML
5 INJECTION, SOLUTION INTRAMUSCULAR; INTRAVENOUS ONCE
Status: COMPLETED | OUTPATIENT
Start: 2023-01-17 | End: 2023-01-17

## 2023-01-17 RX ORDER — MORPHINE SULFATE 4 MG/ML
4 INJECTION INTRAVENOUS ONCE
Status: COMPLETED | OUTPATIENT
Start: 2023-01-17 | End: 2023-01-17

## 2023-01-17 RX ORDER — SODIUM CHLORIDE 9 MG/ML
1000 INJECTION, SOLUTION INTRAVENOUS ONCE
Status: COMPLETED | OUTPATIENT
Start: 2023-01-17 | End: 2023-01-17

## 2023-01-17 RX ORDER — IBUPROFEN 600 MG/1
600 TABLET ORAL ONCE
Status: COMPLETED | OUTPATIENT
Start: 2023-01-17 | End: 2023-01-17

## 2023-01-17 RX ORDER — OXYCODONE HYDROCHLORIDE AND ACETAMINOPHEN 5; 325 MG/1; MG/1
1 TABLET ORAL EVERY 4 HOURS PRN
Qty: 15 TABLET | Refills: 0 | Status: SHIPPED | OUTPATIENT
Start: 2023-01-17 | End: 2023-01-20

## 2023-01-17 RX ORDER — CYCLOBENZAPRINE HCL 5 MG
5-10 TABLET ORAL 3 TIMES DAILY PRN
Qty: 30 TABLET | Refills: 0 | Status: SHIPPED | OUTPATIENT
Start: 2023-01-17

## 2023-01-17 RX ADMIN — MORPHINE SULFATE 4 MG: 4 INJECTION INTRAVENOUS at 08:35

## 2023-01-17 RX ADMIN — KETOROLAC TROMETHAMINE 30 MG: 30 INJECTION, SOLUTION INTRAMUSCULAR; INTRAVENOUS at 13:57

## 2023-01-17 RX ADMIN — DIAZEPAM 5 MG: 5 INJECTION, SOLUTION INTRAMUSCULAR; INTRAVENOUS at 11:36

## 2023-01-17 RX ADMIN — LIDOCAINE 1 PATCH: 50 PATCH TOPICAL at 13:58

## 2023-01-17 RX ADMIN — SODIUM CHLORIDE 1000 ML: 9 INJECTION, SOLUTION INTRAVENOUS at 11:02

## 2023-01-17 RX ADMIN — OXYCODONE AND ACETAMINOPHEN 1 TABLET: 5; 325 TABLET ORAL at 13:55

## 2023-01-17 RX ADMIN — IBUPROFEN 600 MG: 600 TABLET, FILM COATED ORAL at 08:11

## 2023-01-17 RX ADMIN — MORPHINE SULFATE 4 MG: 4 INJECTION INTRAVENOUS at 11:03

## 2023-01-17 ASSESSMENT — LIFESTYLE VARIABLES
DO YOU DRINK ALCOHOL: NO
EVER HAD A DRINK FIRST THING IN THE MORNING TO STEADY YOUR NERVES TO GET RID OF A HANGOVER: NO
TOTAL SCORE: 0
EVER FELT BAD OR GUILTY ABOUT YOUR DRINKING: NO
HAVE PEOPLE ANNOYED YOU BY CRITICIZING YOUR DRINKING: NO
DOES PATIENT WANT TO STOP DRINKING: NO
TOTAL SCORE: 0
TOTAL SCORE: 0
HAVE YOU EVER FELT YOU SHOULD CUT DOWN ON YOUR DRINKING: NO
CONSUMPTION TOTAL: INCOMPLETE

## 2023-01-17 ASSESSMENT — PAIN DESCRIPTION - PAIN TYPE: TYPE: ACUTE PAIN

## 2023-01-17 NOTE — ED NOTES
Pt [place] apartment [place] this morning, denies any head in jury or spine tenderness. Denies any pain to the ribs. Left lateral back pain and left hip pain. Pt able to transfer from wheelchair to gurney with minimal assistance. MD has been to bedside. Pt is aware to remain NPO.

## 2023-01-17 NOTE — ED PROVIDER NOTES
ER Provider Note    Scribed for Patricia Rao M.d. by Juan Mancia. 1/17/2023  8:13 AM    Primary Care Provider: Hector Llanos D.O.    CHIEF COMPLAINT  Chief Complaint   Patient presents with    Fall Less than 10 Feet     Slipped on the ice and fell down 3 concrete stairs     LIMITATION TO HISTORY   Select: : None    HPI/ROS  OUTSIDE HISTORIAN(S):  None    EXTERNAL RECORDS REVIEWED  None    Sheri Carbajal is a 32 y.o. female who presents to the ED for evaluation of a moderate ground level fall onset prior to arrival. The patient states that while walking down some icy steps earlier this morning she suddenly slipped and fell from a height of approximately 3 stairs. She reports primarily striking her left hip during his incident. Sheri was helped to her feet by her partner and was minimally able to apply weight to the left leg. She was ultimately prompted to visit the ED over complaints of continued pain and concerns of possible fracture. Associated symptoms include left hip pain, reduced ambulatory capacity, groin pain, and lower back pain. The patient denies any head strike, loss of consciousness, vomiting, or loss of sensation to the left lower extremity. She medicated with Ibuprofen with minimal alleviation. Her left hip pain is exacerbated with any movement or pressure. Sheri denies any pertinent medical history and is unaware of any allergies.     PAST MEDICAL HISTORY  History reviewed. No pertinent past medical history.    SURGICAL HISTORY  Past Surgical History:   Procedure Laterality Date    TONSILLECTOMY         FAMILY HISTORY  Family History   Problem Relation Age of Onset    Cancer Mother         Ovarian cancer 40's.    No Known Problems Father     No Known Problems Sister     No Known Problems Brother     No Known Problems Maternal Grandmother     No Known Problems Maternal Grandfather     No Known Problems Paternal Grandmother     No Known Problems Paternal Grandfather        SOCIAL HISTORY    "reports that she has quit smoking. Her smoking use included cigarettes. She has never used smokeless tobacco. She reports current alcohol use. She reports that she does not use drugs.    CURRENT MEDICATIONS  Discharge Medication List as of 1/17/2023  2:28 PM        CONTINUE these medications which have NOT CHANGED    Details   TRINTELLIX 10 MG Tab TAKE 1 TABLET BY MOUTH IN THE MORNING FOR 4 WEEKS, ODETTE, Historical Med             ALLERGIES  Patient has no known allergies.    PHYSICAL EXAM  /85   Pulse 90   Temp 36.1 °C (97 °F) (Temporal)   Resp (!) 24   Ht 1.702 m (5' 7\")   Wt 54.4 kg (120 lb)   SpO2 100%   BMI 18.79 kg/m²   Constitutional: Alert in no apparent distress.  HENT: No signs of trauma, Bilateral external ears normal, Nose normal.   Eyes: Pupils are equal and reactive, Conjunctiva normal, Non-icteric.   Neck: No stridor.   Cardiovascular: Regular rate and rhythm, no murmurs.   Thorax & Lungs: Normal breath sounds, No respiratory distress, No wheezing, No chest tenderness.   Abdomen: Bowel sounds normal, Soft, No tenderness, No masses, No peritoneal signs.  Skin: Warm, Dry, No erythema, No rash.   Back: Tenderness over the L-spine.   Musculoskeletal: Tenderness to the left hip and pelvis, No shortening, No obvious rotation of the left foot.   Neurologic: Alert, moving all extremities without difficulty, no focal deficits.    DIAGNOSTIC STUDIES & PROCEDURES    Radiology:   The attending Emergency Physician has independently interpreted the diagnostic imaging associated with this visit and is awaiting the final reading from the radiologist, which will be displayed below.    CT-LSPINE W/O PLUS RECONS   Final Result      1.  Acute, nondisplaced fractures of the left L2 and L3 transverse processes.   2.  No other lumbar spine fractures.      CT-PELVIS W/O PLUS RECONS   Final Result         No acute fracture or dislocation.      DX-PELVIS-1 OR 2 VIEWS   Final Result      No acute osseous abnormality. "         COURSE & MEDICAL DECISION MAKING    8:13 AM - Patient seen and evaluated at bedside. Patient will be treated with Motrin 600 mg and Morphine 4 mg for her symptoms. Ordered CT-Lspine W/O Plus Recons and DX-Pelvis 1 or 2 Views to evaluate. She understands and agrees to the plan of care. Differential diagnoses include but are not limited to: fracture vs contusion.     ED Observation Status? Yes; I am placing the patient in to an observation status due to a diagnostic uncertainty as well as therapeutic intensity. Patient placed in observation status at 8:18 AM, 1/17/2023.     Observation plan is as follows: Imaging, pain management, and possible Orthopedic consult.    Upon Reevaluation, the patient's condition has: Improved; and will be discharged.    Patient discharged from ED Observation status at 2:45 (Time) on 1/17/23.     8:53 AM - Ordered CT-Pelvis W/O Plus Recons to further evaluate the patient.     10:30 AM - Ordered Morphine 4 mg to treat the patient's continued hip and back pain.    10:51 AM - Ordered NS Infusion 1000 mL to treat the patient.     11:32 AM - Ordered Valium 5 mg to treat the patient's continued hip and back pain.     12:56 PM - Paged Neuro Surgery.     12:59 PM - I discussed the patient's case and the above findings with Dr. Goins (Neuro Surgery) who relayed to manage the patient's pain as needed and to have them follow up in clinic.    1:00 PM - Patient was reevaluated at bedside. Explained radiology results with the patient and informed them that imaging revealed acute, nondisplaced fractures of the left L2 and L3 transverse processes. Discussed my consult with Dr. Goins as noted above.     1:22 PM - Ordered Lidocaine 5% 1 patch, Percocet 5-325 mg 1 tablet, and Toradol 30 mg to treat the patient.     INITIAL ASSESSMENT AND PLAN  Care Narrative: This is a 32-year-old female that presents with back pain after slipping and falling on ice.  Patient was quite tender in her left hip on  exam.  She had no obvious deformities of her left leg.  Imaging was performed and she does ultimately have evidence of L2 and L3 transverse process fractures no hip or femur fracture.  She was able to ambulate once she got some pain medication.  Neurosurgery did not feel there was any intervention needed at this time.  She can manage her symptoms as tolerated.  She will be given pain medication to help with this and discharged.      ADDITIONAL PROBLEM LIST AND DISPOSITION    Problem #1: Slip and fall on ice  Problem #2: Left L2 and L3 transverse process fractures    I have discussed management of the patient with the following physicians and DOLLY's: Dr. Goins (Neuro Surgery)      HYDRATION: Based on the patient's presentation of Dehydration the patient was given IV fluids. IV Hydration was used because oral hydration was not adequate alone. Upon recheck following hydration, the patient was improved.    I reviewed prescription monitoring program for patient's narcotic use before prescribing a scheduled drug.The patient will not drink alcohol nor drive with prescribed medications. The patient will return for new or worsening symptoms and is stable at the time of discharge.    The patient is referred to a primary physician for blood pressure management, diabetic screening, and for all other preventative health concerns.    In prescribing controlled substances to this patient, I certify that I have obtained and reviewed the medical history of Sheri Carbajal. I have also made a good cristela effort to obtain applicable records from other providers who have treated the patient and records did not demonstrate any increased risk of substance abuse that would prevent me from prescribing controlled substances.     I have conducted a physical exam and documented it. I have reviewed Ms. Carbajal’s prescription history as maintained by the [place] Prescription Monitoring Program.     I have assessed the patient’s risk for abuse,  dependency, and addiction using the validated Opioid Risk Tool available at https://www.mdcalc.com/fbddnf-mfxs-zona-ort-narcotic-abuse.     Given the above, I believe the benefits of controlled substance therapy outweigh the risks. The reasons for prescribing controlled substances include non-narcotic, oral analgesic alternatives have been inadequate for pain control. Accordingly, I have discussed the risk and benefits, treatment plan, and alternative therapies with the patient.       DISPOSITION:  Patient will be discharged home in stable condition.    FOLLOW UP:  Hector Llanos D.O.  [address]  [address]  [facility] [address]  [phone]    Schedule an appointment as soon as possible for a visit in 1 week      [facility], Emergency Dept  [address] [facility] [id number] [phone]    Return for worsening pain, weakness or other concerns    OUTPATIENT MEDICATIONS:  Discharge Medication List as of 1/17/2023  2:28 PM        START taking these medications    Details   oxyCODONE-acetaminophen (PERCOCET) 5-325 MG Tab Take 1 Tablet by mouth every four hours as needed for Moderate Pain for up to 3 days., Disp-15 Tablet, R-0, Normal      cyclobenzaprine (FLEXERIL) 5 mg tablet Take 1-2 Tablets by mouth 3 times a day as needed for Mild Pain or Muscle Spasms., Disp-30 Tablet, R-0, Normal           FINAL IMPRESSION   1. Fall, initial encounter    2. Closed fracture of spinous process of lumbar vertebra, initial encounter ([facility])         IJuan (Ginette), am scribing for, and in the presence of, Patricia Rao M.D..    Electronically signed by: Juan Mancia (Kaydenibrocio), 1/17/2023    Patricia LEONARD M.D. personally performed the services described in this documentation, as scribed by Juan Mancia in my presence, and it is both accurate and complete.    The note accurately reflects work and decisions made by me.  Patricia Rao M.D.  1/17/2023  2:48 PM

## 2023-01-17 NOTE — ED NOTES
Pt family to desk stating pt having worsening pain. Ice packs provided. Apologized for wait times.

## 2023-01-17 NOTE — ED NOTES
Pt reports decreased pain since the morphine was given. Pt denies needs, aware the wait for CT is long.

## 2023-01-17 NOTE — ED TRIAGE NOTES
"Chief Complaint   Patient presents with    Fall Less than 10 Feet     Slipped on the ice and fell down 3 concrete stairs     Patient to triage in a  for the above complaint. Patient was walking outside when she slipped on the ice and fell down 3 concrete stairs. Patient landed on her back and left side and is complaining sever pain in her back and left hip. Patient cannot currently bear weight on her left leg.     Patient denies hitting her head. No obvious shortening or rotation noted but patient unable to lie flat or transfer from  at this time due to pain.    Pt is alert and oriented, speaking in full sentences, follows commands and responds appropriately to questions. Resp are even and unlabored.      Pt placed in lobby. Pt educated on triage process. Pt encouraged to alert staff for any changes.     Patient and staff wearing appropriate PPE.    /85   Pulse 90   Temp 36.1 °C (97 °F) (Temporal)   Resp (!) 24   Ht 1.702 m (5' 7\")   Wt 54.4 kg (120 lb)   SpO2 100%    "

## 2023-01-17 NOTE — ED NOTES
Patient medicated per MAR, ambulated to bathroom with assistance from boyfriend, in pain but tolerated ambulating.   Patient provided 2 ice packs, water, juice, and crackers.

## 2023-01-17 NOTE — ED NOTES
"Patient reports pain 8/10 after receiving morphine, per MAR. Patient states that she is \"more comfortable,\" but still in pain in lower back, pelvis and left hip.   ERP aware.   Patient updated on plan of care. Lights dimmed, TV on for distraction.   "

## 2023-01-17 NOTE — ED NOTES
Pts significant other up to  to let this tech know that pts pain in back is worsening. Tech apologized for wait time and explained triage process.

## 2023-01-17 NOTE — ED NOTES
"Sheri Carbajal D/C'godwin.  Discharge instructions including s/s to return to ED, follow up appointments, hydration importance and pain management education  provided to pt.    Pt verbalized understanding with no further questions and concerns.    Copy of discharge provided to pt.  Signed copy in chart.    Prescription for flexeril and percocet e-script provided to pt.   Pt wheeled out of department by boyfriend; pt in NAD, awake, alert, interactive and age appropriate.  VS /77   Pulse 72   Temp 37.1 °C (98.8 °F) (Temporal)   Resp (!) 21   Ht 1.702 m (5' 7\")   Wt 54.4 kg (120 lb)   SpO2 98%   BMI 18.79 kg/m²   Percocet consent signed and in chart.       "

## 2023-01-17 NOTE — ED NOTES
Patient returned from CT, in no apparent distress, IV flushed with no redness or swelling present, IV fluids restarted. Patient reports pain is 7/10 in lower back, pelvis, and left hip. Lights dimmed and tv remote provided for distraction.

## 2023-01-20 ENCOUNTER — TELEMEDICINE (OUTPATIENT)
Dept: MEDICAL GROUP | Facility: MEDICAL CENTER | Age: 33
End: 2023-01-20
Payer: COMMERCIAL

## 2023-01-20 VITALS — HEIGHT: 67 IN | BODY MASS INDEX: 18.83 KG/M2 | WEIGHT: 120 LBS

## 2023-01-20 DIAGNOSIS — S32.009D CLOSED FRACTURE OF TRANSVERSE PROCESS OF LUMBAR VERTEBRA WITH ROUTINE HEALING: ICD-10-CM

## 2023-01-20 PROCEDURE — 99213 OFFICE O/P EST LOW 20 MIN: CPT | Mod: 95 | Performed by: STUDENT IN AN ORGANIZED HEALTH CARE EDUCATION/TRAINING PROGRAM

## 2023-01-20 RX ORDER — BUSPIRONE HYDROCHLORIDE 10 MG/1
10 TABLET ORAL
COMMUNITY
Start: 2023-01-16

## 2023-01-20 RX ORDER — HYDROCODONE BITARTRATE AND ACETAMINOPHEN 5; 325 MG/1; MG/1
1 TABLET ORAL EVERY 6 HOURS PRN
Qty: 30 TABLET | Refills: 0 | Status: SHIPPED | OUTPATIENT
Start: 2023-01-20 | End: 2023-01-24 | Stop reason: SDUPTHER

## 2023-01-20 RX ORDER — LUMATEPERONE 42 MG/1
1 CAPSULE ORAL EVERY MORNING
COMMUNITY
Start: 2022-11-04

## 2023-01-21 NOTE — PROGRESS NOTES
Virtual Visit: Established Patient   This visit was conducted via Zoom using secure and encrypted videoconferencing technology.   The patient was in their home in the [facility].    The patient's identity was confirmed and verbal consent was obtained for this virtual visit.    Subjective:   CC:   Chief Complaint   Patient presents with    Medication Refill     Pain med     Sheri Carbajal is a 32 y.o. female presenting for evaluation and management of:    Problem   Closed Fracture of Transverse Process of Lumbar Vertebra With Routine Healing    Acute condition. She had a recent fall on 1/17/23 and suffered nondisplaced fractures of the left L2 and L3 transverse processes. She was evaluated by neurosurgery and was recommended conservative management. She was sent home with oxycodone 5-325mg #15. She has been taking oxycodone 5mg q4h as needed which has been helping. She has run out of her medications and would like a refill.    She does have mild associated constipation which she is taking Colace and prune juice.    Character: sharp pain  Onset: 1/17/2023 after fall injury  Location: low back  Duration: constant  Exacerbating factors: certain body movement  Relieving factors: oxycodone  Associated symptoms: none  Severity: 8/10         ROS   See HPI    Current medicines (including changes today)  Current Outpatient Medications   Medication Sig Dispense Refill    HYDROcodone-acetaminophen (NORCO) 5-325 MG Tab per tablet Take 1 Tablet by mouth every 6 hours as needed (severe pain) for up to 8 days. 30 Tablet 0    CAPLYTA 42 MG Cap Take 1 Capsule by mouth every morning.      busPIRone (BUSPAR) 10 MG Tab tablet Take 10 mg by mouth every day.      cyclobenzaprine (FLEXERIL) 5 mg tablet Take 1-2 Tablets by mouth 3 times a day as needed for Mild Pain or Muscle Spasms. 30 Tablet 0    TRINTELLIX 10 MG Tab TAKE 1 TABLET BY MOUTH IN THE MORNING FOR 4 WEEKS       No current facility-administered medications for this visit.  "      Patient Active Problem List    Diagnosis Date Noted    Closed fracture of transverse process of lumbar vertebra with routine healing 01/20/2023    IUD (intrauterine device) in place 06/07/2022    Menstrual cramps 06/07/2022    Right hand pain 02/17/2022    Daily headache 11/08/2021    Unexplained weight loss 10/30/2020    PTSD (post-traumatic stress disorder) 08/11/2020    Low back pain with left-sided sciatica 07/30/2020    Vaginal irritation 07/06/2020    Cervicogenic headache 07/06/2020    Bipolar affective disorder, currently depressed, mild (HCC) 07/06/2020    Left breast lump 06/05/2019    Tobacco use 03/20/2019    Moderate episode of recurrent major depressive disorder (HCC) 03/01/2019    Generalized anxiety disorder 03/01/2019    Encounter for screening examination for sexually transmitted disease 03/01/2019    Muscle spasm 03/01/2019        Objective:   Ht 1.702 m (5' 7\")   Wt 54.4 kg (120 lb)   BMI 18.79 kg/m²     Physical Exam: limited exam due to virtual visit  Constitutional: Alert, no distress. Laying in bed.  Respiratory: Unlabored respiratory effort  Neuro: Alert and oriented x3, normal conversation.     Assessment and Plan:   The following treatment plan was discussed:     1. Closed fracture of transverse process of lumbar vertebra with routine healing  Acute condition, stable with pain control. Patient is bed-bound for now and cannot come in.  - switch to Norco per order, PDMP reviewed and appropriate  - advised to add lidocaine patches for pain relief  - cont laxatives as needed  - HYDROcodone-acetaminophen (NORCO) 5-325 MG Tab per tablet; Take 1 Tablet by mouth every 6 hours as needed (severe pain) for up to 8 days.  Dispense: 30 Tablet; Refill: 0    Follow-up: Return in about 1 week (around 1/27/2023) for disability paperwork.         "

## 2023-01-23 PROBLEM — Z02.89 ENCOUNTER FOR COMPLETION OF FORM WITH PATIENT: Status: ACTIVE | Noted: 2023-01-23

## 2023-01-23 NOTE — PROGRESS NOTES
Subjective:     CC: disability paperwork    HPI:   Sheri presents today for disability paperwork    Problem   Encounter for Completion of Form With Patient    Patient requesting completion of disability paperwork today. She suffered lumbar L2-3 fracture on 1/17/23 after ground level fall due to ice. She is applying for short term disability. She plans to return to light duty work on 2/13/23 if recovery goes well.     Closed Fracture of Transverse Process of Lumbar Vertebra With Routine Healing    Acute condition. She reports about 15% improvement since DOI.    She had a recent fall on 1/17/23 and suffered nondisplaced fractures of the left L2 and L3 transverse processes. She was evaluated by neurosurgery and was recommended conservative management. She was sent home with oxycodone 5-325mg #15. She has been taking oxycodone 5mg q4h as needed which has been helping. She has run out of her medications and would like a refill.    She does have mild associated constipation which she is taking Colace and prune juice.    Character: sharp pain  Onset: 1/17/2023 after fall injury  Location: low back  Duration: constant  Exacerbating factors: certain body movement  Relieving factors: oxycodone  Associated symptoms: none  Severity: 8/10         Current Outpatient Medications Ordered in Epic   Medication Sig Dispense Refill    HYDROcodone-acetaminophen (NORCO) 5-325 MG Tab per tablet Take 1 Tablet by mouth every 8 hours as needed (severe pain) for up to 10 days. 30 Tablet 0    CAPLYTA 42 MG Cap Take 1 Capsule by mouth every morning.      busPIRone (BUSPAR) 10 MG Tab tablet Take 10 mg by mouth every day.      cyclobenzaprine (FLEXERIL) 5 mg tablet Take 1-2 Tablets by mouth 3 times a day as needed for Mild Pain or Muscle Spasms. 30 Tablet 0    TRINTELLIX 10 MG Tab TAKE 1 TABLET BY MOUTH IN THE MORNING FOR 4 WEEKS       No current Epic-ordered facility-administered medications on file.     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  Alcohol/tobacco/illicit drugs: smokes 1 pack per week on and off x 15 years, social EtOH, denies illicit drugs  Diet/Exercise: tries to eat healthy in general, exercise with running regularly     Health Maintenance: reviewed and discussed with patient  Vaccines: flu received 10/2021, Tdap received 08/2021, PPSV23 received 06/2019, Pfizer COVID #3 received 12/2021  Pap smear + hrHPV 03/2019 negative    ROS:  Gen: no fevers/chills  Pulm: no sob, no cough  CV: no chest pain, no palpitations  GI: no nausea/vomiting, no diarrhea  : no dysuria  MSk: + low back pain  Skin: no rash  Neuro: no headaches, no numbness/tingling    Objective:     Exam:  /68 (BP Location: Left arm, Patient Position: Sitting, BP Cuff Size: Adult)   Pulse 81   Temp 36.9 °C (98.5 °F) (Temporal)   Resp 20   Ht 1.702 m (5' 7\")   Wt 54.4 kg (120 lb)   SpO2 99%   BMI 18.79 kg/m²  Body mass index is 18.79 kg/m².    Gen:    Alert and oriented, No apparent distress.  Lungs: Normal effort, CTA bilaterally, no wheezes, rhonchi, or rales  CV:      Regular rate and rhythm. No murmurs, rubs, or gallops.  Ext:      No clubbing, cyanosis, edema.  MSK:   + TTP to left side lumbar paraspinal muscles with mild edema    Assessment & Plan:     32 y.o. female with the following -     1. Closed fracture of transverse process of lumbar vertebra with routine healing  Acute condition, stable and improved with pain control.  - cont Norco 5-325mg as needed for severe pain, PDMP reviewed and appropriate, refill sent for 30 days  - cont ibuprofen as needed for pain, relative rest, slow return to normal activities as tolerated  - advised to add lidocaine patches for pain relief  - cont laxatives as needed  - HYDROcodone-acetaminophen (NORCO) 5-325 MG Tab per tablet; Take 1 Tablet by mouth every 8 hours as needed (severe pain) for up to 10 days.  Dispense: 30 Tablet; " Refill: 0  - Referral to Physical Therapy    2. Encounter for completion of form with patient  - disability form completed and return to patient    Return in about 2 weeks (around 2/7/2023) for low back injury.    Please note that this dictation was created using voice recognition software. I have made every reasonable attempt to correct obvious errors, but I expect that there are errors of grammar and possibly content that I did not discover before finalizing the note.

## 2023-01-24 ENCOUNTER — OFFICE VISIT (OUTPATIENT)
Dept: MEDICAL GROUP | Facility: MEDICAL CENTER | Age: 33
End: 2023-01-24
Payer: COMMERCIAL

## 2023-01-24 VITALS
HEIGHT: 67 IN | SYSTOLIC BLOOD PRESSURE: 114 MMHG | DIASTOLIC BLOOD PRESSURE: 68 MMHG | WEIGHT: 120 LBS | TEMPERATURE: 98.5 F | OXYGEN SATURATION: 99 % | HEART RATE: 81 BPM | BODY MASS INDEX: 18.83 KG/M2 | RESPIRATION RATE: 20 BRPM

## 2023-01-24 DIAGNOSIS — S32.009D CLOSED FRACTURE OF TRANSVERSE PROCESS OF LUMBAR VERTEBRA WITH ROUTINE HEALING: ICD-10-CM

## 2023-01-24 DIAGNOSIS — Z02.89 ENCOUNTER FOR COMPLETION OF FORM WITH PATIENT: ICD-10-CM

## 2023-01-24 PROCEDURE — 99213 OFFICE O/P EST LOW 20 MIN: CPT | Performed by: STUDENT IN AN ORGANIZED HEALTH CARE EDUCATION/TRAINING PROGRAM

## 2023-01-24 RX ORDER — HYDROCODONE BITARTRATE AND ACETAMINOPHEN 5; 325 MG/1; MG/1
1 TABLET ORAL EVERY 8 HOURS PRN
Qty: 30 TABLET | Refills: 0 | Status: SHIPPED | OUTPATIENT
Start: 2023-01-24 | End: 2023-02-03

## 2023-02-10 ENCOUNTER — OFFICE VISIT (OUTPATIENT)
Dept: MEDICAL GROUP | Facility: MEDICAL CENTER | Age: 33
End: 2023-02-10
Payer: COMMERCIAL

## 2023-02-10 VITALS
OXYGEN SATURATION: 100 % | RESPIRATION RATE: 16 BRPM | WEIGHT: 130.07 LBS | HEIGHT: 67 IN | SYSTOLIC BLOOD PRESSURE: 118 MMHG | HEART RATE: 78 BPM | DIASTOLIC BLOOD PRESSURE: 68 MMHG | BODY MASS INDEX: 20.42 KG/M2 | TEMPERATURE: 98 F

## 2023-02-10 DIAGNOSIS — S32.009D CLOSED FRACTURE OF TRANSVERSE PROCESS OF LUMBAR VERTEBRA WITH ROUTINE HEALING: ICD-10-CM

## 2023-02-10 PROCEDURE — 99213 OFFICE O/P EST LOW 20 MIN: CPT | Performed by: STUDENT IN AN ORGANIZED HEALTH CARE EDUCATION/TRAINING PROGRAM

## 2023-02-10 RX ORDER — HYDROCODONE BITARTRATE AND ACETAMINOPHEN 5; 325 MG/1; MG/1
1 TABLET ORAL
Qty: 21 TABLET | Refills: 0 | Status: SHIPPED | OUTPATIENT
Start: 2023-02-10 | End: 2023-03-03

## 2023-02-10 NOTE — PROGRESS NOTES
Subjective:     CC: lumbar fracture follow up    HPI:   Sheri presents today for lumbar fracture follow up    Problem   Closed Fracture of Transverse Process of Lumbar Vertebra With Routine Healing    Acute condition. Since last visit, her pain level has improved to 5/10. She has been doing physical therapy. She reports about 50% improvement since DOI. She has cut down on Norco 5 once a day as needed. She does need refill on medication today.    She had a recent fall on 1/17/23 and suffered nondisplaced fractures of the left L2 and L3 transverse processes. She was evaluated by neurosurgery and was recommended conservative management. She was sent home with oxycodone 5-325mg #15. She has been taking oxycodone 5mg q4h as needed which has been helping. She has run out of her medications and would like a refill.    She does have mild associated constipation which she is taking Colace and prune juice.    Character: sharp pain  Onset: 1/17/2023 after fall injury  Location: low back  Duration: constant  Exacerbating factors: certain body movement  Relieving factors: oxycodone  Associated symptoms: none  Severity: 8/10         Current Outpatient Medications Ordered in Epic   Medication Sig Dispense Refill    HYDROcodone-acetaminophen (NORCO) 5-325 MG Tab per tablet Take 1 Tablet by mouth 1 time a day as needed (severe pain) for up to 21 days. 21 Tablet 0    CAPLYTA 42 MG Cap Take 1 Capsule by mouth every morning.      busPIRone (BUSPAR) 10 MG Tab tablet Take 10 mg by mouth every day.      cyclobenzaprine (FLEXERIL) 5 mg tablet Take 1-2 Tablets by mouth 3 times a day as needed for Mild Pain or Muscle Spasms. 30 Tablet 0    TRINTELLIX 10 MG Tab TAKE 1 TABLET BY MOUTH IN THE MORNING FOR 4 WEEKS       No current Epic-ordered facility-administered medications on file.     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  Alcohol/tobacco/illicit drugs: smokes 1 pack per week on and off x 15 years, social EtOH, denies illicit drugs  Diet/Exercise: tries to eat healthy in general, exercise with running regularly     Health Maintenance: reviewed and discussed with patient  Vaccines: flu received 10/2021, Tdap received 08/2021, PPSV23 received 06/2019, Pfizer COVID #3 received 12/2021  Pap smear + hrHPV 03/2019 negative     ROS:  Gen: no fevers/chills  Pulm: no sob, no cough  CV: no chest pain, no palpitations  GI: no nausea/vomiting, no diarrhea  MSk: + low back pain  Skin: no rash  Neuro: no headaches, no numbness/tingling    Objective:     Exam:  /68 (BP Location: Left arm, Patient Position: Sitting, BP Cuff Size: Adult)   Pulse 78   Temp 36.7 °C (98 °F) (Temporal)   Resp 16   Ht 1.702 m (5' 7\")   Wt 59 kg (130 lb 1.1 oz)   SpO2 100%   BMI 20.37 kg/m²  Body mass index is 20.37 kg/m².    Gen:    Alert and oriented, No apparent distress.  Lungs: Normal effort, CTA bilaterally, no wheezes, rhonchi, or rales  CV:      Regular rate and rhythm. No murmurs, rubs, or gallops.  Ext:      No clubbing, cyanosis, edema.  MSK:   + TTP to left side lumbar paraspinal muscles with mild edema    Assessment & Plan:     32 y.o. female with the following -     1. Closed fracture of transverse process of lumbar vertebra with routine healing  Acute condition, stable and continue to improve with pain control and physical therapy.  - extended return to work date to 2/27/23 with restrictions  - cont Norco 5-325mg as needed for severe pain, PDMP reviewed and appropriate, refill sent for 30 days  - cont ibuprofen/lidocaine patches as needed for pain, relative rest, slow return to normal activities as tolerated  - cont laxatives as needed  - HYDROcodone-acetaminophen (NORCO) 5-325 MG Tab per tablet; Take 1 Tablet by mouth 1 time a day as needed (severe pain) for up to 21 days.  Dispense: 21 Tablet; Refill: 0      Return if symptoms worsen or " fail to improve.    Please note that this dictation was created using voice recognition software. I have made every reasonable attempt to correct obvious errors, but I expect that there are errors of grammar and possibly content that I did not discover before finalizing the note.

## 2023-02-10 NOTE — LETTER
February 10, 2023    To Whom It May Concern:         This is confirmation that Sheri Carbajal (: 1990) attended her scheduled appointment with Hector Llanos D.O. on 2/10/23.    Please excuse Sheri from work duties from 23 until 23 due to acute medical condition. Sheri may return to work on 2023 with restrictions of no lifting/pushing/pulling more than 20 pounds for a duration of 4 weeks.         If you have any questions please do not hesitate to call me at the phone number listed below.    Sincerely,          Hector Llanos D.O.  [phone]

## 2023-02-16 ENCOUNTER — HOSPITAL ENCOUNTER (OUTPATIENT)
Facility: MEDICAL CENTER | Age: 33
End: 2023-02-16
Payer: COMMERCIAL

## 2023-02-16 PROCEDURE — 88305 TISSUE EXAM BY PATHOLOGIST: CPT | Mod: 59

## 2023-02-17 LAB — PATHOLOGY CONSULT NOTE: NORMAL

## 2023-06-02 ENCOUNTER — HOSPITAL ENCOUNTER (OUTPATIENT)
Facility: MEDICAL CENTER | Age: 33
End: 2023-06-02
Attending: NURSE PRACTITIONER
Payer: COMMERCIAL

## 2023-06-02 ENCOUNTER — OFFICE VISIT (OUTPATIENT)
Dept: MEDICAL GROUP | Facility: MEDICAL CENTER | Age: 33
End: 2023-06-02
Payer: COMMERCIAL

## 2023-06-02 VITALS
TEMPERATURE: 98.8 F | HEART RATE: 71 BPM | SYSTOLIC BLOOD PRESSURE: 114 MMHG | OXYGEN SATURATION: 98 % | RESPIRATION RATE: 16 BRPM | HEIGHT: 67 IN | DIASTOLIC BLOOD PRESSURE: 76 MMHG | WEIGHT: 135.69 LBS | BODY MASS INDEX: 21.3 KG/M2

## 2023-06-02 DIAGNOSIS — Z79.899 CONTROLLED SUBSTANCE AGREEMENT SIGNED: ICD-10-CM

## 2023-06-02 DIAGNOSIS — F41.0 PANIC ATTACKS: ICD-10-CM

## 2023-06-02 PROCEDURE — 3078F DIAST BP <80 MM HG: CPT | Performed by: NURSE PRACTITIONER

## 2023-06-02 PROCEDURE — 99214 OFFICE O/P EST MOD 30 MIN: CPT | Performed by: NURSE PRACTITIONER

## 2023-06-02 PROCEDURE — G0481 DRUG TEST DEF 8-14 CLASSES: HCPCS

## 2023-06-02 PROCEDURE — 3074F SYST BP LT 130 MM HG: CPT | Performed by: NURSE PRACTITIONER

## 2023-06-02 RX ORDER — CLONAZEPAM 0.5 MG/1
0.5 TABLET ORAL
Qty: 14 TABLET | Refills: 0 | Status: SHIPPED | OUTPATIENT
Start: 2023-06-02 | End: 2023-06-16

## 2023-06-02 NOTE — ASSESSMENT & PLAN NOTE
New problem.  Patient reports random panic attacks that are debilitating.  Patient states she is prone to anxiety panic attacks given a lot of things going on in her life.  Patient reports many changes in her life that are good however the making her feel overwhelmed.  She is moving to [place] in a few weeks, she is moving in with her partner and she is changing jobs.  She has tried was for propranolol as well as Atarax in the past.  BuSpar makes her stomach upset.  In the past she has tried Klonopin which worked well for her.  She uses it sparingly as needed once or twice per week.   She takes no other controlled substances, she denies any street drugs or marijuana.  PDMP verified noted last refill of Norco on 2/10/2023 this was given to her by her primary care provider for her back pain.  This has since resolved and she is no longer taking any controlled substance.  Controlled substance agreement discussed and signed today.  UA drug screen obtained today.

## 2023-06-02 NOTE — PROGRESS NOTES
"Chief Complaint   Patient presents with    Panic Attack       HISTORY OF PRESENT ILLNESS: Patient is a 33 y.o. female, established patient who presents today to discuss medical problems as listed below:    Health Maintenance:  COMPLETED       Allergies: Patient has no known allergies.    Current Outpatient Medications   Medication Sig Dispense Refill    clonazePAM (KLONOPIN) 0.5 MG Tab Take 1 Tablet by mouth 1 time a day as needed (for panic attacks) for up to 14 days. 14 Tablet 0    CAPLYTA 42 MG Cap Take 1 Capsule by mouth every morning.      busPIRone (BUSPAR) 10 MG Tab tablet Take 10 mg by mouth every day.      cyclobenzaprine (FLEXERIL) 5 mg tablet Take 1-2 Tablets by mouth 3 times a day as needed for Mild Pain or Muscle Spasms. 30 Tablet 0    TRINTELLIX 10 MG Tab TAKE 1 TABLET BY MOUTH IN THE MORNING FOR 4 WEEKS       No current facility-administered medications for this visit.       Allergies, past medical history, past surgical history, family history, social history reviewed and updated.    Review of Systems:     - Constitutional: Negative for fever, chills, unexpected weight change, and fatigue/generalized weakness.      - Respiratory: Negative for cough, sputum production, chest congestion, dyspnea, wheezing, and crackles.      - Cardiovascular: Negative for chest pain, palpitations, orthopnea, and bilateral lower extremity edema.      - Psychiatric/Behavioral: panic attacks. Negative for depression, suicidal/homicidal ideation and memory loss.      All other systems reviewed and are negative    Exam:    /76 (BP Location: Left arm, Patient Position: Sitting, BP Cuff Size: Adult)   Pulse 71   Temp 37.1 °C (98.8 °F) (Temporal)   Resp 16   Ht 1.702 m (5' 7\")   Wt 61.5 kg (135 lb 11.1 oz)   SpO2 98%   BMI 21.25 kg/m²  Body mass index is 21.25 kg/m².    Physical Exam:  Constitutional: Well-developed and well-nourished. Not diaphoretic. No distress.   Cardiovascular: Regular rate and rhythm, S1 and " S2 without murmur, rubs, or gallops.    Chest: Effort normal. Clear to auscultation throughout. No adventitious sounds. Neurological: Alert and oriented x 3.   Psychiatric:  panic attacks. Behavior, mood, and affect are appropriate.  MA/nursing note and vitals reviewed.    Assessment/Plan:  Panic attacks  New problem.  Patient reports random panic attacks that are debilitating.  Patient states she is prone to anxiety panic attacks given a lot of things going on in her life.  Patient reports many changes in her life that are good however the making her feel overwhelmed.  She is moving to [place] in a few weeks, she is moving in with her partner and she is changing jobs.  She has tried was for propranolol as well as Atarax in the past.  BuSpar makes her stomach upset.  In the past she has tried Klonopin which worked well for her.  She uses it sparingly as needed once or twice per week.   She takes no other controlled substances, she denies any street drugs or marijuana.  PDMP verified noted last refill of Norco on 2/10/2023 this was given to her by her primary care provider for her back pain.  This has since resolved and she is no longer taking any controlled substance.  Controlled substance agreement discussed and signed today.  UA drug screen obtained today.    1. Panic attacks  Controlled substance discussed with client. Client agrees to abide by controlled substance contract.   - Controlled Substance Treatment Agreement  - Pain Management Screen; Future  - clonazePAM (KLONOPIN) 0.5 MG Tab; Take 1 Tablet by mouth 1 time a day as needed (for panic attacks) for up to 14 days.  Dispense: 14 Tablet; Refill: 0    2. Controlled substance agreement signed  - Controlled Substance Treatment Agreement  - Pain Management Screen; Future  - clonazePAM (KLONOPIN) 0.5 MG Tab; Take 1 Tablet by mouth 1 time a day as needed (for panic attacks) for up to 14 days.  Dispense: 14 Tablet; Refill: 0      Discussed with patient possible  alternative diagnoses, patient is to take all medications as prescribed.      If symptoms persist FU w/PCP, if symptoms worsen go to emergency room.      If experiencing any side effects from prescribed medications report to the office immediately or go to emergency room.     Reviewed indication, dosage, usage and potential adverse effects of prescribed medications.      Reviewed risks and benefits of treatment plan. Patient verbalizes understanding of all instruction and verbally agrees to plan.     Discussed plan with the patient, and patient agrees to the above.      I personally reviewed prior external notes and test results pertinent to today's visit.      No follow-ups on file.

## 2023-06-07 LAB
1OH-MIDAZOLAM UR QL SCN: NOT DETECTED
6MAM UR QL: NOT DETECTED
7AMINOCLONAZEPAM UR QL: NOT DETECTED
A-OH ALPRAZ UR QL: NOT DETECTED
ALPRAZ UR QL: NOT DETECTED
AMPHET UR QL SCN: NOT DETECTED
ANNOTATION COMMENT IMP: NORMAL
ANNOTATION COMMENT IMP: NORMAL
BARBITURATES UR QL: NOT DETECTED
BUPRENORPHINE UR QL: NOT DETECTED
BZE UR QL: NOT DETECTED
CARBOXYTHC UR QL: NOT DETECTED
CARISOPRODOL UR QL: NOT DETECTED
CLONAZEPAM UR QL: NOT DETECTED
CODEINE UR QL: NOT DETECTED
DIAZEPAM UR QL: NOT DETECTED
ETHYL GLUCURONIDE UR QL: NOT DETECTED
FENTANYL UR QL: NOT DETECTED
GABAPENTIN UR QL: NOT DETECTED
HYDROCODONE UR QL: NOT DETECTED
HYDROMORPHONE UR QL: NOT DETECTED
LORAZEPAM UR QL: NOT DETECTED
MDA UR QL: NOT DETECTED
MDEA UR QL: NOT DETECTED
MDMA UR QL: NOT DETECTED
MEPERIDINE UR QL: NOT DETECTED
METHADONE UR QL: NOT DETECTED
METHAMPHET UR QL: NOT DETECTED
MIDAZOLAM UR QL SCN: NOT DETECTED
MORPHINE UR QL: NOT DETECTED
NALOXONE UR QL SCN: NOT DETECTED
NORBUPRENORPHINE UR QL CFM: NOT DETECTED
NORDIAZEPAM UR QL: NOT DETECTED
NORFENTANYL UR QL: NOT DETECTED
NORHYDROCODONE UR QL CFM: NOT DETECTED
NOROXYCODONE UR QL CFM: NOT DETECTED
NOROXYMORPH CO100 Q0458: NOT DETECTED
OXAZEPAM UR QL: NOT DETECTED
OXYCODONE UR QL: NOT DETECTED
OXYMORPHONE UR QL: NOT DETECTED
PATHOLOGY STUDY: NORMAL
PCP UR QL: NOT DETECTED
PHENTERMINE UR QL: NOT DETECTED
PPAA UR QL: NOT DETECTED
PREGABALIN UR QL SCN: NOT DETECTED
SERVICE CMNT-IMP: NORMAL
TAPENADOL OSULF CO200 Q0473: NOT DETECTED
TAPENTADOL UR QL SCN: NOT DETECTED
TEMAZEPAM UR QL: NOT DETECTED
TRAMADOL UR QL: NOT DETECTED
ZOLPIDEM PHENYL-4-CARB UR QL SCN: NOT DETECTED
ZOLPIDEM UR QL: NOT DETECTED

## 2023-10-18 ENCOUNTER — DOCUMENTATION (OUTPATIENT)
Dept: HEALTH INFORMATION MANAGEMENT | Facility: OTHER | Age: 33
End: 2023-10-18
Payer: COMMERCIAL